# Patient Record
Sex: MALE | Race: WHITE | URBAN - METROPOLITAN AREA
[De-identification: names, ages, dates, MRNs, and addresses within clinical notes are randomized per-mention and may not be internally consistent; named-entity substitution may affect disease eponyms.]

---

## 2017-08-20 ENCOUNTER — INPATIENT (INPATIENT)
Facility: HOSPITAL | Age: 57
LOS: 1 days | Discharge: HOME | DRG: 299 | End: 2017-08-22
Attending: SURGERY | Admitting: SURGERY
Payer: COMMERCIAL

## 2017-08-20 VITALS
RESPIRATION RATE: 16 BRPM | SYSTOLIC BLOOD PRESSURE: 150 MMHG | DIASTOLIC BLOOD PRESSURE: 92 MMHG | HEART RATE: 90 BPM | TEMPERATURE: 99 F | WEIGHT: 189.82 LBS | OXYGEN SATURATION: 98 %

## 2017-08-20 DIAGNOSIS — I77.71 DISSECTION OF CAROTID ARTERY: ICD-10-CM

## 2017-08-20 DIAGNOSIS — Z98.890 OTHER SPECIFIED POSTPROCEDURAL STATES: Chronic | ICD-10-CM

## 2017-08-20 LAB
ALBUMIN SERPL ELPH-MCNC: 4.2 G/DL — SIGNIFICANT CHANGE UP (ref 3.3–5)
ALP SERPL-CCNC: 71 U/L — SIGNIFICANT CHANGE UP (ref 40–120)
ALT FLD-CCNC: 35 U/L — SIGNIFICANT CHANGE UP (ref 10–45)
ANION GAP SERPL CALC-SCNC: 11 MMOL/L — SIGNIFICANT CHANGE UP (ref 5–17)
AST SERPL-CCNC: 27 U/L — SIGNIFICANT CHANGE UP (ref 10–40)
BASOPHILS NFR BLD AUTO: 0.3 % — SIGNIFICANT CHANGE UP (ref 0–2)
BILIRUB SERPL-MCNC: 0.2 MG/DL — SIGNIFICANT CHANGE UP (ref 0.2–1.2)
BUN SERPL-MCNC: 15 MG/DL — SIGNIFICANT CHANGE UP (ref 7–23)
CALCIUM SERPL-MCNC: 10.2 MG/DL — SIGNIFICANT CHANGE UP (ref 8.4–10.5)
CHLORIDE SERPL-SCNC: 95 MMOL/L — LOW (ref 96–108)
CO2 SERPL-SCNC: 27 MMOL/L — SIGNIFICANT CHANGE UP (ref 22–31)
CREAT SERPL-MCNC: 0.8 MG/DL — SIGNIFICANT CHANGE UP (ref 0.5–1.3)
EOSINOPHIL NFR BLD AUTO: 1.6 % — SIGNIFICANT CHANGE UP (ref 0–6)
GLUCOSE SERPL-MCNC: 103 MG/DL — HIGH (ref 70–99)
HCT VFR BLD CALC: 43 % — SIGNIFICANT CHANGE UP (ref 39–50)
HGB BLD-MCNC: 15.1 G/DL — SIGNIFICANT CHANGE UP (ref 13–17)
LYMPHOCYTES # BLD AUTO: 23.7 % — SIGNIFICANT CHANGE UP (ref 13–44)
MCHC RBC-ENTMCNC: 30 PG — SIGNIFICANT CHANGE UP (ref 27–34)
MCHC RBC-ENTMCNC: 35.1 G/DL — SIGNIFICANT CHANGE UP (ref 32–36)
MCV RBC AUTO: 85.5 FL — SIGNIFICANT CHANGE UP (ref 80–100)
MONOCYTES NFR BLD AUTO: 8.2 % — SIGNIFICANT CHANGE UP (ref 2–14)
NEUTROPHILS NFR BLD AUTO: 66.2 % — SIGNIFICANT CHANGE UP (ref 43–77)
PLATELET # BLD AUTO: 298 K/UL — SIGNIFICANT CHANGE UP (ref 150–400)
POTASSIUM SERPL-MCNC: 4.7 MMOL/L — SIGNIFICANT CHANGE UP (ref 3.5–5.3)
POTASSIUM SERPL-SCNC: 4.7 MMOL/L — SIGNIFICANT CHANGE UP (ref 3.5–5.3)
PROT SERPL-MCNC: 7.8 G/DL — SIGNIFICANT CHANGE UP (ref 6–8.3)
RBC # BLD: 5.03 M/UL — SIGNIFICANT CHANGE UP (ref 4.2–5.8)
RBC # FLD: 12.5 % — SIGNIFICANT CHANGE UP (ref 10.3–16.9)
SODIUM SERPL-SCNC: 133 MMOL/L — LOW (ref 135–145)
WBC # BLD: 7.3 K/UL — SIGNIFICANT CHANGE UP (ref 3.8–10.5)
WBC # FLD AUTO: 7.3 K/UL — SIGNIFICANT CHANGE UP (ref 3.8–10.5)

## 2017-08-20 PROCEDURE — 70498 CT ANGIOGRAPHY NECK: CPT | Mod: 26

## 2017-08-20 PROCEDURE — 99291 CRITICAL CARE FIRST HOUR: CPT

## 2017-08-20 PROCEDURE — 93010 ELECTROCARDIOGRAM REPORT: CPT | Mod: NC

## 2017-08-20 PROCEDURE — 70496 CT ANGIOGRAPHY HEAD: CPT | Mod: 26

## 2017-08-20 PROCEDURE — 99285 EMERGENCY DEPT VISIT HI MDM: CPT | Mod: 25

## 2017-08-20 RX ORDER — LISINOPRIL 2.5 MG/1
40 TABLET ORAL DAILY
Qty: 0 | Refills: 0 | Status: DISCONTINUED | OUTPATIENT
Start: 2017-08-20 | End: 2017-08-22

## 2017-08-20 RX ORDER — VENLAFAXINE HCL 75 MG
300 CAPSULE, EXT RELEASE 24 HR ORAL DAILY
Qty: 0 | Refills: 0 | Status: DISCONTINUED | OUTPATIENT
Start: 2017-08-20 | End: 2017-08-22

## 2017-08-20 RX ORDER — RAMIPRIL 5 MG
1 CAPSULE ORAL
Qty: 0 | Refills: 0 | COMMUNITY

## 2017-08-20 RX ORDER — HYDRALAZINE HCL 50 MG
10 TABLET ORAL ONCE
Qty: 0 | Refills: 0 | Status: COMPLETED | OUTPATIENT
Start: 2017-08-20 | End: 2017-08-20

## 2017-08-20 RX ORDER — SODIUM CHLORIDE 9 MG/ML
1000 INJECTION INTRAMUSCULAR; INTRAVENOUS; SUBCUTANEOUS
Qty: 0 | Refills: 0 | Status: DISCONTINUED | OUTPATIENT
Start: 2017-08-20 | End: 2017-08-21

## 2017-08-20 RX ORDER — KETOROLAC TROMETHAMINE 30 MG/ML
15 SYRINGE (ML) INJECTION EVERY 6 HOURS
Qty: 0 | Refills: 0 | Status: DISCONTINUED | OUTPATIENT
Start: 2017-08-20 | End: 2017-08-21

## 2017-08-20 RX ORDER — INSULIN LISPRO 100/ML
VIAL (ML) SUBCUTANEOUS
Qty: 0 | Refills: 0 | Status: DISCONTINUED | OUTPATIENT
Start: 2017-08-20 | End: 2017-08-22

## 2017-08-20 RX ORDER — DEXTROSE 50 % IN WATER 50 %
25 SYRINGE (ML) INTRAVENOUS ONCE
Qty: 0 | Refills: 0 | Status: DISCONTINUED | OUTPATIENT
Start: 2017-08-20 | End: 2017-08-22

## 2017-08-20 RX ORDER — DEXTROSE 50 % IN WATER 50 %
12.5 SYRINGE (ML) INTRAVENOUS ONCE
Qty: 0 | Refills: 0 | Status: DISCONTINUED | OUTPATIENT
Start: 2017-08-20 | End: 2017-08-22

## 2017-08-20 RX ORDER — LIOTHYRONINE SODIUM 25 UG/1
12.5 TABLET ORAL
Qty: 0 | Refills: 0 | COMMUNITY

## 2017-08-20 RX ORDER — LABETALOL HCL 100 MG
10 TABLET ORAL EVERY 6 HOURS
Qty: 0 | Refills: 0 | Status: DISCONTINUED | OUTPATIENT
Start: 2017-08-20 | End: 2017-08-22

## 2017-08-20 RX ORDER — HEPARIN SODIUM 5000 [USP'U]/ML
3500 INJECTION INTRAVENOUS; SUBCUTANEOUS EVERY 6 HOURS
Qty: 0 | Refills: 0 | Status: DISCONTINUED | OUTPATIENT
Start: 2017-08-20 | End: 2017-08-20

## 2017-08-20 RX ORDER — HEPARIN SODIUM 5000 [USP'U]/ML
7000 INJECTION INTRAVENOUS; SUBCUTANEOUS ONCE
Qty: 0 | Refills: 0 | Status: COMPLETED | OUTPATIENT
Start: 2017-08-20 | End: 2017-08-20

## 2017-08-20 RX ORDER — HYDRALAZINE HCL 50 MG
5 TABLET ORAL ONCE
Qty: 0 | Refills: 0 | Status: COMPLETED | OUTPATIENT
Start: 2017-08-20 | End: 2017-08-20

## 2017-08-20 RX ORDER — HEPARIN SODIUM 5000 [USP'U]/ML
INJECTION INTRAVENOUS; SUBCUTANEOUS
Qty: 25000 | Refills: 0 | Status: DISCONTINUED | OUTPATIENT
Start: 2017-08-20 | End: 2017-08-21

## 2017-08-20 RX ORDER — HYDROCHLOROTHIAZIDE 25 MG
0 TABLET ORAL
Qty: 0 | Refills: 0 | COMMUNITY

## 2017-08-20 RX ORDER — ACETAMINOPHEN 500 MG
1000 TABLET ORAL ONCE
Qty: 0 | Refills: 0 | Status: COMPLETED | OUTPATIENT
Start: 2017-08-20 | End: 2017-08-20

## 2017-08-20 RX ORDER — TAMSULOSIN HYDROCHLORIDE 0.4 MG/1
0.4 CAPSULE ORAL AT BEDTIME
Qty: 0 | Refills: 0 | Status: DISCONTINUED | OUTPATIENT
Start: 2017-08-20 | End: 2017-08-21

## 2017-08-20 RX ORDER — ASPIRIN/CALCIUM CARB/MAGNESIUM 324 MG
325 TABLET ORAL ONCE
Qty: 0 | Refills: 0 | Status: COMPLETED | OUTPATIENT
Start: 2017-08-20 | End: 2017-08-20

## 2017-08-20 RX ORDER — GLUCAGON INJECTION, SOLUTION 0.5 MG/.1ML
1 INJECTION, SOLUTION SUBCUTANEOUS ONCE
Qty: 0 | Refills: 0 | Status: DISCONTINUED | OUTPATIENT
Start: 2017-08-20 | End: 2017-08-22

## 2017-08-20 RX ORDER — HYDROCORTISONE 20 MG
0 TABLET ORAL
Qty: 0 | Refills: 0 | COMMUNITY

## 2017-08-20 RX ORDER — VENLAFAXINE HCL 75 MG
300 CAPSULE, EXT RELEASE 24 HR ORAL
Qty: 0 | Refills: 0 | COMMUNITY

## 2017-08-20 RX ORDER — HEPARIN SODIUM 5000 [USP'U]/ML
7000 INJECTION INTRAVENOUS; SUBCUTANEOUS EVERY 6 HOURS
Qty: 0 | Refills: 0 | Status: DISCONTINUED | OUTPATIENT
Start: 2017-08-20 | End: 2017-08-20

## 2017-08-20 RX ORDER — SODIUM CHLORIDE 9 MG/ML
1000 INJECTION, SOLUTION INTRAVENOUS
Qty: 0 | Refills: 0 | Status: DISCONTINUED | OUTPATIENT
Start: 2017-08-20 | End: 2017-08-22

## 2017-08-20 RX ORDER — LEVOTHYROXINE SODIUM 125 MCG
12.5 TABLET ORAL DAILY
Qty: 0 | Refills: 0 | Status: DISCONTINUED | OUTPATIENT
Start: 2017-08-20 | End: 2017-08-22

## 2017-08-20 RX ORDER — BREXPIPRAZOLE 0.25 MG/1
1 TABLET ORAL
Qty: 0 | Refills: 0 | COMMUNITY

## 2017-08-20 RX ORDER — LEVOTHYROXINE SODIUM 125 MCG
12.5 TABLET ORAL
Qty: 0 | Refills: 0 | COMMUNITY

## 2017-08-20 RX ORDER — DEXTROSE 50 % IN WATER 50 %
1 SYRINGE (ML) INTRAVENOUS ONCE
Qty: 0 | Refills: 0 | Status: DISCONTINUED | OUTPATIENT
Start: 2017-08-20 | End: 2017-08-22

## 2017-08-20 RX ORDER — TAMSULOSIN HYDROCHLORIDE 0.4 MG/1
1 CAPSULE ORAL
Qty: 0 | Refills: 0 | COMMUNITY

## 2017-08-20 RX ORDER — MIRTAZAPINE 45 MG/1
60 TABLET, ORALLY DISINTEGRATING ORAL AT BEDTIME
Qty: 0 | Refills: 0 | Status: DISCONTINUED | OUTPATIENT
Start: 2017-08-20 | End: 2017-08-21

## 2017-08-20 RX ORDER — ASPIRIN/CALCIUM CARB/MAGNESIUM 324 MG
81 TABLET ORAL DAILY
Qty: 0 | Refills: 0 | Status: DISCONTINUED | OUTPATIENT
Start: 2017-08-20 | End: 2017-08-22

## 2017-08-20 RX ORDER — METOCLOPRAMIDE HCL 10 MG
10 TABLET ORAL ONCE
Qty: 0 | Refills: 0 | Status: COMPLETED | OUTPATIENT
Start: 2017-08-20 | End: 2017-08-20

## 2017-08-20 RX ORDER — LEVOTHYROXINE SODIUM 125 MCG
125 TABLET ORAL DAILY
Qty: 0 | Refills: 0 | Status: DISCONTINUED | OUTPATIENT
Start: 2017-08-20 | End: 2017-08-20

## 2017-08-20 RX ADMIN — Medication 15 MILLIGRAM(S): at 22:55

## 2017-08-20 RX ADMIN — HEPARIN SODIUM 1600 UNIT(S)/HR: 5000 INJECTION INTRAVENOUS; SUBCUTANEOUS at 19:46

## 2017-08-20 RX ADMIN — Medication 10 MILLIGRAM(S): at 22:55

## 2017-08-20 RX ADMIN — HEPARIN SODIUM 7000 UNIT(S): 5000 INJECTION INTRAVENOUS; SUBCUTANEOUS at 19:46

## 2017-08-20 RX ADMIN — Medication 325 MILLIGRAM(S): at 20:25

## 2017-08-20 RX ADMIN — Medication 400 MILLIGRAM(S): at 21:56

## 2017-08-20 RX ADMIN — Medication 1000 MILLIGRAM(S): at 22:56

## 2017-08-20 RX ADMIN — Medication 5 MILLIGRAM(S): at 20:25

## 2017-08-20 RX ADMIN — Medication 10 MILLIGRAM(S): at 16:32

## 2017-08-20 NOTE — ED ADULT NURSE NOTE - CHIEF COMPLAINT QUOTE
scuba diving in [place] on 8/2 and had clogged ears and HA ; also HTN; had hyperbaric treatment on last Monday and ears cleared up but still has HA; has seen PCP and started on meds for HTN; and has seen [facility]  who sent him here today because hyperbaric treatment did not help HA; supposed to see Alfie SHEIKH

## 2017-08-20 NOTE — CONSULT NOTE ADULT - SUBJECTIVE AND OBJECTIVE BOX
HPI:  Pt is a 56yo M PMHx chronic fatigue syndrome, depression, HTN who presents to [facility] ED with headache since 8/2/2017. Pt  went for scuber diving 8/2 and afterwards developed headache, nausea, unstable gait and inability to concentrate. He saw his PCP who referred him to ENT specialist. ENT physician recommended hyperbaric chamber which Pt received 6hrs of treatment on 8/14/16. Afterwards unstable gait and nausea resolved but headache persisted. During the headache episode, Pt is also unable to concentrated. Headache is described as throbbing and typically occurs in the evening and last about 5hrs. Pt has being taken excedrin which helped to relieve the headache. From 8/2/17, Pt denies blurry vision, dizziness, weakness, numbness, tingling, memory lost, change in speech, confusion, slurred speech, facial/eye drooping, CP/SOB.  ENT physician advised Pt to come to [facility] ED further evaluation. At the ED CTA head/neck was performed which revealed b/l carotid dissection. (20 Aug 2017 21:44)      PAST MEDICAL & SURGICAL HISTORY:  HTN (hypertension)  Chronic fatigue syndrome  Depression  History of back surgery: lumbar spine  H/O hernia repair      ROS: See HPI    MEDICATIONS  (STANDING):  heparin  Infusion.  Unit(s)/Hr (16 mL/Hr) IV Continuous <Continuous>  sodium chloride 0.9%. 1000 milliLiter(s) (100 mL/Hr) IV Continuous <Continuous>  lisinopril 40 milliGRAM(s) Oral daily  venlafaxine XR. 300 milliGRAM(s) Oral daily  mirtazapine 60 milliGRAM(s) Oral at bedtime  tamsulosin 0.4 milliGRAM(s) Oral at bedtime  insulin lispro (HumaLOG) corrective regimen sliding scale   SubCutaneous three times a day before meals  dextrose 5%. 1000 milliLiter(s) (50 mL/Hr) IV Continuous <Continuous>  dextrose 50% Injectable 12.5 Gram(s) IV Push once  dextrose 50% Injectable 25 Gram(s) IV Push once  dextrose 50% Injectable 25 Gram(s) IV Push once  levothyroxine 12.5 MICROGram(s) Oral daily    MEDICATIONS  (PRN):  labetalol Injectable 10 milliGRAM(s) IV Push every 6 hours PRN sbp> 140  dextrose Gel 1 Dose(s) Oral once PRN Blood Glucose LESS THAN 70 milliGRAM(s)/deciliter  glucagon  Injectable 1 milliGRAM(s) IntraMuscular once PRN Glucose LESS THAN 70 milligrams/deciliter  ketorolac   Injectable 15 milliGRAM(s) IV Push every 6 hours PRN Severe Pain (7 - 10)      Allergies    No Known Allergies    Intolerances        SOCIAL HISTORY:  Smoke: Never Smoker  EtOH: occasional    FAMILY HISTORY:      Vital Signs Last 24 Hrs  T(C): 36.1 (20 Aug 2017 21:15), Max: 37 (20 Aug 2017 15:01)  T(F): 97 (20 Aug 2017 21:15), Max: 98.6 (20 Aug 2017 15:01)  HR: 85 (20 Aug 2017 20:06) (83 - 90)  BP: 153/91 (20 Aug 2017 20:06) (150/92 - 153/91)  BP(mean): --  RR: 16 (20 Aug 2017 20:06) (16 - 16)  SpO2: 97% (20 Aug 2017 20:06) (97% - 98%)    PHYSICAL EXAM    Gen: NAD, AOx3  Neuro: CN II-XII intact  CV: RRR, no M/R/G  Pulm: CTABL, no wheezes or rhonchi  Abd: Soft, NT/ND  Ext: WWP, no edema  Vasc: distal pulses palpable    LABS:                        15.1   7.3   )-----------( 298      ( 20 Aug 2017 16:25 )             43.0     08-20    133<L>  |  95<L>  |  15  ----------------------------<  103<H>  4.7   |  27  |  0.80    Ca    10.2      20 Aug 2017 16:25    TPro  7.8  /  Alb  4.2  /  TBili  0.2  /  DBili  x   /  AST  27  /  ALT  35  /  AlkPhos  71  08-20    PT/INR - ( 20 Aug 2017 17:21 )   PT: 11.3 sec;   INR: 1.02          PTT - ( 20 Aug 2017 17:21 )  PTT:36.1 sec      RADIOLOGY & ADDITIONAL STUDIES:    Assessment and Plan:  56 y/o M PMHx chronic fatigue syndrome, depression, HTN with b/l internal carotid artery dissection HPI:  Pt is a 56yo M PMHx chronic fatigue syndrome, depression, HTN who presents to [facility] ED with headache since 8/2/2017. Pt  went for scuber diving 8/2 and afterwards developed headache, nausea, unstable gait and inability to concentrate. He saw his PCP who referred him to ENT specialist. ENT physician recommended hyperbaric chamber which Pt received 6hrs of treatment on 8/14/16. Afterwards unstable gait and nausea resolved but headache persisted. During the headache episode, Pt is also unable to concentrated. Headache is described as throbbing and typically occurs in the evening and last about 5hrs. Pt has being taken excedrin which helped to relieve the headache. From 8/2/17, Pt denies blurry vision, dizziness, weakness, numbness, tingling, memory lost, change in speech, confusion, slurred speech, facial/eye drooping, CP/SOB.  ENT physician advised Pt to come to [facility] ED further evaluation. At the ED CTA head/neck was performed which revealed b/l carotid dissection. (20 Aug 2017 21:44)      PAST MEDICAL & SURGICAL HISTORY:  HTN (hypertension)  Chronic fatigue syndrome  Depression  History of back surgery: lumbar spine  H/O hernia repair      ROS: See HPI    MEDICATIONS  (STANDING):  heparin  Infusion.  Unit(s)/Hr (16 mL/Hr) IV Continuous <Continuous>  sodium chloride 0.9%. 1000 milliLiter(s) (100 mL/Hr) IV Continuous <Continuous>  lisinopril 40 milliGRAM(s) Oral daily  venlafaxine XR. 300 milliGRAM(s) Oral daily  mirtazapine 60 milliGRAM(s) Oral at bedtime  tamsulosin 0.4 milliGRAM(s) Oral at bedtime  insulin lispro (HumaLOG) corrective regimen sliding scale   SubCutaneous three times a day before meals  dextrose 5%. 1000 milliLiter(s) (50 mL/Hr) IV Continuous <Continuous>  dextrose 50% Injectable 12.5 Gram(s) IV Push once  dextrose 50% Injectable 25 Gram(s) IV Push once  dextrose 50% Injectable 25 Gram(s) IV Push once  levothyroxine 12.5 MICROGram(s) Oral daily    MEDICATIONS  (PRN):  labetalol Injectable 10 milliGRAM(s) IV Push every 6 hours PRN sbp> 140  dextrose Gel 1 Dose(s) Oral once PRN Blood Glucose LESS THAN 70 milliGRAM(s)/deciliter  glucagon  Injectable 1 milliGRAM(s) IntraMuscular once PRN Glucose LESS THAN 70 milligrams/deciliter  ketorolac   Injectable 15 milliGRAM(s) IV Push every 6 hours PRN Severe Pain (7 - 10)      Allergies    No Known Allergies    Intolerances        SOCIAL HISTORY:  Smoke: Never Smoker  EtOH: occasional    FAMILY HISTORY:      Vital Signs Last 24 Hrs  T(C): 36.1 (20 Aug 2017 21:15), Max: 37 (20 Aug 2017 15:01)  T(F): 97 (20 Aug 2017 21:15), Max: 98.6 (20 Aug 2017 15:01)  HR: 85 (20 Aug 2017 20:06) (83 - 90)  BP: 153/91 (20 Aug 2017 20:06) (150/92 - 153/91)  BP(mean): --  RR: 16 (20 Aug 2017 20:06) (16 - 16)  SpO2: 97% (20 Aug 2017 20:06) (97% - 98%)    PHYSICAL EXAM    Gen: NAD, AOx3  Neuro: CN II-XII intact  CV: RRR, no M/R/G  Pulm: CTABL, no wheezes or rhonchi  Abd: Soft, NT/ND  Ext: WWP, no edema  Vasc: distal pulses palpable    LABS:                        15.1   7.3   )-----------( 298      ( 20 Aug 2017 16:25 )             43.0     08-20    133<L>  |  95<L>  |  15  ----------------------------<  103<H>  4.7   |  27  |  0.80    Ca    10.2      20 Aug 2017 16:25    TPro  7.8  /  Alb  4.2  /  TBili  0.2  /  DBili  x   /  AST  27  /  ALT  35  /  AlkPhos  71  08-20    PT/INR - ( 20 Aug 2017 17:21 )   PT: 11.3 sec;   INR: 1.02          PTT - ( 20 Aug 2017 17:21 )  PTT:36.1 sec      RADIOLOGY & ADDITIONAL STUDIES:    Assessment and Plan:  58 y/o M PMHx chronic fatigue syndrome, depression, HTN with b/l internal carotid artery dissection  58 y/o M with B/l Carotid dissection post scuba diving    Neuro: Neuro checks q1 effexor, remeron, Tylenol IV and toradol prn pain.   CV: HD stable BP goal 100-140 ASA 81 qd, hep gtt Lisinopril, labetalol prn  Pulm: Satting well placed on O2 for HA  GI: NPO pMN   : Voids Flomax   ID: [id number]  Endo: ISS synthroid  ppx: Heparin   Lines: PIV

## 2017-08-20 NOTE — ED PROVIDER NOTE - CARE PLAN
Principal Discharge DX:	Carotid artery dissection  Secondary Diagnosis:	Nonintractable headache, unspecified chronicity pattern, unspecified headache type

## 2017-08-20 NOTE — ED CLERICAL - NS ED CLERK NOTE PRE-ARRIVAL INFORMATION; ADDITIONAL PRE-ARRIVAL INFORMATION
PATIENT COMING IN FOR HIGH BLOOD PRESSURE WITH HEADACHE. PLEASE EVALUATE AND CALL DR. PATRICK REGARDING PATIENT.

## 2017-08-20 NOTE — H&P ADULT - NSHPPHYSICALEXAM_GEN_ALL_CORE
Vital Signs Last 24 Hrs  T(C): 36.1 (20 Aug 2017 21:15), Max: 37 (20 Aug 2017 15:01)  T(F): 97 (20 Aug 2017 21:15), Max: 98.6 (20 Aug 2017 15:01)  HR: 85 (20 Aug 2017 20:06) (83 - 90)  BP: 153/91 (20 Aug 2017 20:06) (150/92 - 153/91)  BP(mean): --  RR: 16 (20 Aug 2017 20:06) (16 - 16)  SpO2: 97% (20 Aug 2017 20:06) (97% - 98%)    General: Uncomfortable due to 6/10 headache, no facial or eye drooping  Eyes: PERRLA, normal visual acuity  Lungs: Unlabored breathing, no respiratory distress  Cardiac: RRR  Neck: no neck swelling, ecchymosis or erythema  Neuro: A/O x3, CN I-XII grossly intact, 5/5 upper and lower extremity strength b/l

## 2017-08-20 NOTE — ED ADULT NURSE REASSESSMENT NOTE - NS ED NURSE REASSESS COMMENT FT1
Pt /102.  MD made aware. Pt denies any changes in symptoms.  Ambulatory with even gait. Alert and oriented x3.

## 2017-08-20 NOTE — ED ADULT NURSE NOTE - OBJECTIVE STATEMENT
Patient alert and oriented x 3 came for constant headache with nausea since 8/2 after the scuba diving . Pt. is diagnosed with HTN recently and taking the medicine as prescribed . Denies any vomiting , blurry vision nor dizziness at this time . Not in distress , safety maintained . Will continue to monitor .

## 2017-08-20 NOTE — H&P ADULT - HISTORY OF PRESENT ILLNESS
Pt is a 58yo M PMHx chronic fatigue syndrome, depression, HTN who presents to [facility] ED with headache since 8/2/2017. Pt  went for scuber diving 8/2 and afterwards developed the headache, nausea, unstable gait and inability to concentrate. He saw his PCP who referred him to ENT specialist. ENT physician recommended hyperbaric chamber which Pt received 6hrs of treatment on 8/14/16. Afterwards unstable gait and nausea resolved but headache persisted. During the headache episode, Pt is also unable to concentrated. Headache is described as throbbing and typically occurs in the evening and last about 5hrs. Pt has being taken excedrin which helped to relieve the headache. From 8/2/17, Pt denies blurry vision, dizziness, weakness, numbness, tingling, memory lost, change in speech, confusion, slurred speech, facial/eye drooping, CP/SOB.  ENT physician advised Pt to come to [facility] ED further evaluation. At the ED CTA head/neck was performed which revealed b/l carotid dissection. Pt is a 58yo M PMHx chronic fatigue syndrome, depression, HTN who presents to [facility] ED with headache since 8/2/2017. Pt  went for scuber diving 8/2 and afterwards developed headache, nausea, unstable gait and inability to concentrate. He saw his PCP who referred him to ENT specialist. ENT physician recommended hyperbaric chamber which Pt received 6hrs of treatment on 8/14/16. Afterwards unstable gait and nausea resolved but headache persisted. During the headache episode, Pt is also unable to concentrated. Headache is described as throbbing and typically occurs in the evening and last about 5hrs. Pt has being taken excedrin which helped to relieve the headache. From 8/2/17, Pt denies blurry vision, dizziness, weakness, numbness, tingling, memory lost, change in speech, confusion, slurred speech, facial/eye drooping, CP/SOB.  ENT physician advised Pt to come to [facility] ED further evaluation. At the ED CTA head/neck was performed which revealed b/l carotid dissection. Pt is a 56yo M PMHx chronic fatigue syndrome, depression, HTN who presents to [facility] ED with headache since 8/2/2017. Pt  went scuba diving 8/2 and afterwards developed headache, nausea, unstable gait and inability to concentrate. He saw his PCP who referred him to ENT specialist. ENT physician recommended hyperbaric chamber which Pt received 6hrs of treatment on 8/14/16. Afterwards unstable gait and nausea resolved but headache persisted. During the headache episode, Pt is also unable to concentrate. Headache is described as throbbing and typically occurs in the evening and last about 5hrs. Pt has being taken excedrin which helped to relieve the headache. From 8/2/17, Pt denies blurry vision, dizziness, weakness, numbness, tingling, memory lost, change in speech, confusion, slurred speech, facial/eye drooping, CP/SOB.  ENT physician advised Pt to come to [facility] ED further evaluation. At the ED CTA head/neck was performed which revealed b/l carotid dissection.

## 2017-08-20 NOTE — ED PROVIDER NOTE - MEDICAL DECISION MAKING DETAILS
here w/ continiued HA after diving despite appropriate therapy w/ hyperbarics which did improve but not take away his HA. I did head ct and angio for concern for alternative diagnosis and turns out that pt has bilateral carotid dissection. will consult vascular

## 2017-08-20 NOTE — ED ADULT TRIAGE NOTE - CHIEF COMPLAINT QUOTE
scuba diving in [facility] on 8/2 and had clogged ears and HA ; also HTN; had hyperbaric treatment on last Monday and ears cleared up but still has HA; has seen PCP and started on meds for HTN; and has seen [facility]  who sent him here today because hyperbaric treatment did not help HA; supposed to see Alfie SHEIKH

## 2017-08-20 NOTE — CONSULT NOTE ADULT - ASSESSMENT
A/recs:  1) bilateral internal carotid artery dissection  -management per Vascular surgery - agree with bb  -currently on heparin gtt  -check lipid panel in am  -rec Neuro eval   2) htn - elevated bp   -check echo in am  -check thyroid studies, ua, and bnp  -monitor on rx  3) chronic fatigue and depression - per primary team  4) maintain K>4, Mg>2  case discussed with Dr. Miller, [facility], and Millie

## 2017-08-20 NOTE — CONSULT NOTE ADULT - CONSULT REASON
Initial Cardiology evaluation and management of head ache with h/o HTN found to have bilateral internal carotid artery dissection

## 2017-08-20 NOTE — H&P ADULT - PROBLEM SELECTOR PLAN 1
Continue heparin gtt (PTT goal 60-80)  BP control (-140)  Aspirin daily  NPO after midnight  Home medication  Neuro checks q1hr Continue heparin gtt (PTT goal 60-80)  BP control (-140)  Aspirin daily  NPO after midnight/IVF  Home medication  Neuro checks q1hr  AM labs

## 2017-08-20 NOTE — ED ADULT NURSE NOTE - CHPI ED SYMPTOMS NEG
no fever/no vomiting/no numbness/no blurred vision/no loss of consciousness/no dizziness/no change in level of consciousness/no weakness/no confusion

## 2017-08-20 NOTE — CONSULT NOTE ADULT - ATTENDING COMMENTS
CCM ATTENDING    Patient seen and discussed with House Officer/Resident  Chart and history reviewed  Exam, labs, and radiology as noted above   Assessment and Plans as outlined  no surgical interventions at present plan for anticoagulation per vascSx team  neuro stable at present  Currently patient's mentation is appropriate   Protecting airway   Breathing is non-labored without increased work of breathing --- no intercostal accessory muscle use and no paradoxical abdominal motion evident   Ventilating and oxygenating adequately without increased work of breathing  Hemodynamically stable---clinically perfusing adequately  Aim to maintain MAP >= 65 mmHg, U/O >= 0.5 ml/kg/hr, pulse oximetry OxSat >= 92%   Metabolic demands along with any abnormal blood chemistries, and fluid requirements being addressed    Sedation and/or pain meds as needed to reduce metabolic demand and maintain comfort   GI/DVT prophylaxis

## 2017-08-20 NOTE — CONSULT NOTE ADULT - SUBJECTIVE AND OBJECTIVE BOX
patient seen and examined on 2017 - this note is for dos 2017  cc: Patient is a 57y old  Male who presents with a chief complaint of Headache - h/o HTN - found to have bilateral internal carotid artery dissection    HPI:  57yom - h/o chronic fatigue syndrome, depression, HTN - sent to ED by ENT ([place]) for elevated blood pressure and headache.  Of note, hyperbaric oxygen 1 week ago at [facility] for suspected bends after scuba diving 3 weeks ago in [facility].  Following scuba diving patient experienced headache, nausea, unstable gait and inability to concentrate.  Following hyperbarix oxygen most symptoms improved, however, headache continued.  Headache improved with excedrin.  Headache described as throbbing.  Sent to er today for continued headache and bp elevations.  Now found to have bilateral carotid artery dissection.  Denies chest pain, dyspnea, palpitations, dizziness, lightheadedness, near-syncope, or syncope. No focal weakness.  Currently without acute complaints.  Patient now in SICU.      PAST MEDICAL & SURGICAL HISTORY:  HTN (hypertension)  Chronic fatigue syndrome  Depression  History of back surgery: lumbar spine  H/O hernia repair    MEDICATIONS  (STANDING):  heparin  Infusion.  Unit(s)/Hr (16 mL/Hr) IV Continuous <Continuous>  sodium chloride 0.9%. 1000 milliLiter(s) (100 mL/Hr) IV Continuous <Continuous>  lisinopril 40 milliGRAM(s) Oral daily  venlafaxine XR. 300 milliGRAM(s) Oral daily  mirtazapine 60 milliGRAM(s) Oral at bedtime  tamsulosin 0.4 milliGRAM(s) Oral at bedtime  insulin lispro (HumaLOG) corrective regimen sliding scale   SubCutaneous three times a day before meals  dextrose 5%. 1000 milliLiter(s) (50 mL/Hr) IV Continuous <Continuous>  dextrose 50% Injectable 12.5 Gram(s) IV Push once  dextrose 50% Injectable 25 Gram(s) IV Push once  dextrose 50% Injectable 25 Gram(s) IV Push once  levothyroxine 12.5 MICROGram(s) Oral daily  aspirin  chewable 81 milliGRAM(s) Oral daily    MEDICATIONS  (PRN):  labetalol Injectable 10 milliGRAM(s) IV Push every 6 hours PRN sbp> 140  dextrose Gel 1 Dose(s) Oral once PRN Blood Glucose LESS THAN 70 milliGRAM(s)/deciliter  glucagon  Injectable 1 milliGRAM(s) IntraMuscular once PRN Glucose LESS THAN 70 milligrams/deciliter  ketorolac   Injectable 15 milliGRAM(s) IV Push every 6 hours PRN Severe Pain (7 - 10)    FAMILY HISTORY: father with mi leading to death at 39 yo - father was "heavy" smoker    SOCIAL HISTORY: 1/2ppd smoking for approximately 15 years - quit 10 yrs ago; rare etoh; denied drug use;  in [place]; ; no children    REVIEW OF SYSTEMS  General:	no fever/chills    Ophthalmologic: no vision change  	  ENMT:	no nasal congestion    Respiratory and Thorax:no dyspnea  	  Cardiovascular:	no cp    Gastrointestinal:	no abd pain, n/v    Genitourinary:	no dysuria    Musculoskeletal:	no focal weakness    Neurological:	headache as above    Psychiatric:	appropriate    Hematology/Lymphatics:no anemia	    Endocrine:	no dm or thyroid disease    Allergic/Immunologic:	nkda    Vital Signs Last 24 Hrs  T(C): 36.1 (20 Aug 2017 21:15), Max: 37 (20 Aug 2017 15:01)  T(F): 97 (20 Aug 2017 21:15), Max: 98.6 (20 Aug 2017 15:01)  HR: 85 (20 Aug 2017 20:06) (83 - 90)  BP: 153/91 (20 Aug 2017 20:06) (150/92 - 153/91)  BP(mean): --  RR: 16 (20 Aug 2017 20:06) (16 - 16)  SpO2: 97% (20 Aug 2017 20:06) (97% - 98%)    PHYSICAL EXAM:  Constitutional:nad; sitting in bed    Eyes:anicteric    ENMT:mmm    Neck no jvd/hjr    Respiratory:clear breath sounds     Cardiovascular:rr; s1/s2 present    Gastrointestinal:soft abd; bowel sounds present    Extremities:no edema    Vascular:le pulses present    Neurological:grossly nonfocal; conversant    Skin:warm    Musculoskeletal:moves 4 ext    Psychiatric:appropriate    EC2017 ecg reviewed    LABS:                        15.1   7.3   )-----------( 298      ( 20 Aug 2017 16:25 )             43.0     08-20    133<L>  |  95<L>  |  15  ----------------------------<  103<H>  4.7   |  27  |  0.80    Ca    10.2      20 Aug 2017 16:25    TPro  7.8  /  Alb  4.2  /  TBili  0.2  /  DBili  x   /  AST  27  /  ALT  35  /  AlkPhos  71  08-20    CARDIAC MARKERS ( 20 Aug 2017 16:25 )  x     / <0.01 ng/mL / x     / x     / x          PT/INR - ( 20 Aug 2017 17:21 )   PT: 11.3 sec;   INR: 1.02          PTT - ( 20 Aug 2017 17:21 )  PTT:36.1 sec    RADIOLOGY & ADDITIONAL STUDIES:  < from: CT Angio Neck w/ IV Cont (17 @ 17:52) >  EXAM:  CT ANGIO BRAIN (W)AW IC                          EXAM:  CT ANGIO NECK (W)AW IC                          PROCEDURE DATE:  2017     67  [employer] 350   5           INTERPRETATION:  CT ANGIOGRAM OF THE CERVICAL ARTERIES   CTA OF THE [place]:    INDICATION: Headache after scuba diving 3 weeks prior.    TECHNIQUE:     CTA [place]:     After the intravenous power injection of non-ionic contrast material,   serial thin sections were obtained through the intracranial circulation   on a multislice CT scanner followed by multiplanar 3-D reformations.   There are no prior studies.    CTA NECK:    After the intravenous power injection of non-ionic contrast material,   serial thin sections were obtained through the cervical circulation on a   multislice CT scanner followed by multiplanar 3-D reformations. There are   no prior studies.    FINDINGS:    CTA [place]:    Posterior circulation demonstrates a hypoplastic right vertebral artery   that predominantly endsin PICA, normal variant. The distal left   vertebral artery and basilar artery are unremarkable. There is slight   ectasia of the basilar artery. Patient demonstrates bilateral fetal   origin PCAs with hypoplastic P1 segments bilaterally, normal variant. No   significant stenosis is seen in the intracranial posterior circulation.    No stenosis is seen in the M[place] bilaterally or MCA bilaterally. The   anterior communicating artery is hypoplastic, normal variant. No stenosis   is seen in the cavernous or supraclinoid ICA bilaterally..    CTA NECK:    Origins of the left subclavian artery, left common carotid artery, right   innominate artery, right subclavian artery, right common carotid artery   are normal. Left vertebral artery origin is patent. Left vertebral artery   is dominant. The right vertebral artery origin is patent. The right   vertebral artery is hypoplastic and predominantly ends in PICA, normal   variant.     At the carotid bifurcation, there is no significant stenosis.    There is marked abnormality of the left internal carotid artery in its   cervical portion, in which there is an abrupt change in caliber at   approximately the C3 level with a suggestion of an intimal flap   consistent with ICA dissection, and there is an area of flow-related   enhancement projecting lateral to the lumen consistent with   pseudoaneurysm. Beyond this area of abnormality, the caliber to the upper   cervical left ICA to the proximal petrous left ICA is thinner in caliber   but there isgood flow seen in the upper cervical left ICA and into the   left petrous ICA where the vessel assumes a more normal caliber in the   mid and distal left petrous ICA. Proximal to this dissection, the lumen   has a slightly nodular appearance suggestive of fibromuscular dysplasia   or mixed connective tissue disorder, which may be an etiology for the   dissection.    There is marked abnormality of the right internal carotid artery just   past its origin in which the lumen has a nodular appearance,which   suggests fibromuscular dysplasia or mixed connective tissue disorder. In   addition, in the upper right cervical ICA there is  an area of linear low   attenuation within the lumen at the C1-C2 level consistent with an   intimal flap of dissection. Medially, there is focal area of flow-related   enhancement suggestive of pseudoaneurysm. Beyond this there is thinner   caliber to the uppermost cervical right ICA extending into the proximal   right petrous ICA segment, which is not flow-limiting, and there is a   more normal caliber at to the mid and distal right petrous ICA.    There is multilevel degenerative disc disease in which findings loss of   disc space height and endplate sclerosis particularly at C5-C6 and C6-C7   levels. Thereappears be a left paracentral hypertrophic foraminal disc   protrusion at the C6-C7 level and a central disc protrusion at the C5-C6   level. There is multilevel degenerative osteoarthritis. Findings include   endplate osteophytosis and uncovertebralspurring. This results in   multilevel foraminal narrowing particularly at the C3-C4 level   bilaterally; left-sided at the C4-C5 level; bilaterally at the C5-C6   level; and left-sided at the C6-C7 level.    IMPRESSION:       CTA COW:      1. No intracranial stenosis of the [place].  2. Normal variants of anatomy including bilateral fetal origin PCAs and   hypoplastic right vertebral artery predominantly ending in PICA.    CTA NECK:    1. Bilateral ICA dissections with pseudoaneurysm formation bilaterally   and areas of concentric narrowing of the upper most cervical ICA   bilaterally, left more than right, extending into the proximal petrous   ICA bilaterally but there is good flow past the area of dissections   bilaterally and a more normal caliber to the mid and distal petrous ICA   bilaterally.  2. Nodularity to the ICA lumen bilaterally proximal to the dissection   suggest underlying mixed connective tissue disorder such as fibromuscular   dysplasia, which may be the underlying etiology for the bilateral   dissections.   3. Multilevel degenerative disc disease and osteoarthritis, particularly   at the C5-C6 and C6-C7 levels.  4. Hypoplastic right vertebral artery predominantly ends in PICA, normal   variant.    Results were discussed with Dr. Miller at the time of dictation.            "Thank you for the opportunity to participate in the care of this   patient."        SONYA NAVA M.D., ATTENDING RADIOLOGIST  This document has been electronically signed. Aug 20 2017  6:26PM        < end of copied text >  < from: CT Head No Cont (17 @ 17:51) >  EXAM:  CT BRAIN                          PROCEDURE DATE:  2017                     INTERPRETATION:  INDICATIONS: Headache after scuba diving 3 weeks prior.    TECHNIQUE:  Serial axial images were obtained from the skull base to the   vertex without the use of intravenous contrast.    COMPARISON EXAMINATION: None.    FINDINGS:    VENTRICLES AND SULCI: Parenchymal volume loss is present which is   commensurate with patient age.   INTRA-AXIAL: No intracranial mass, acute hemorrhage, or midline shift is   present. Gray-white differentiation is preserved.  EXTRA-AXIAL: No extra-axial fluid collection is present.   VISUALIZED SINUSES: No air-fluid levels are identified. There is minimal   mucosal thickening in the ethmoid air cells bilaterally. VISUALIZED   MASTOIDS:  There is minor opacification within the mastoid air cells   bilaterally.  CALVARIUM: No fracture is seen.    IMPRESSION:    No CT evidence of acute intracranial hemorrhage and no apparent acute   abnormality.            "Thank you for the opportunity to participate in the care of this   patient."        SONYA NAVA M.D., ATTENDING RADIOLOGIST  This document has been electronically signed. Aug 20 2017  5:49PM    < end of copied text >

## 2017-08-21 ENCOUNTER — TRANSCRIPTION ENCOUNTER (OUTPATIENT)
Age: 57
End: 2017-08-21

## 2017-08-21 DIAGNOSIS — E87.1 HYPO-OSMOLALITY AND HYPONATREMIA: ICD-10-CM

## 2017-08-21 DIAGNOSIS — I77.3 ARTERIAL FIBROMUSCULAR DYSPLASIA: ICD-10-CM

## 2017-08-21 DIAGNOSIS — I10 ESSENTIAL (PRIMARY) HYPERTENSION: ICD-10-CM

## 2017-08-21 LAB
ANION GAP SERPL CALC-SCNC: 11 MMOL/L — SIGNIFICANT CHANGE UP (ref 5–17)
ANION GAP SERPL CALC-SCNC: 14 MMOL/L — SIGNIFICANT CHANGE UP (ref 5–17)
APPEARANCE UR: CLEAR — SIGNIFICANT CHANGE UP
APTT BLD: 116.6 SEC — HIGH (ref 27.5–37.4)
APTT BLD: 35.6 SEC — SIGNIFICANT CHANGE UP (ref 27.5–37.4)
APTT BLD: 59.3 SEC — HIGH (ref 27.5–37.4)
APTT BLD: >200 SEC — CRITICAL HIGH (ref 27.5–37.4)
BILIRUB UR-MCNC: NEGATIVE — SIGNIFICANT CHANGE UP
BUN SERPL-MCNC: 13 MG/DL — SIGNIFICANT CHANGE UP (ref 7–23)
BUN SERPL-MCNC: 15 MG/DL — SIGNIFICANT CHANGE UP (ref 7–23)
CALCIUM SERPL-MCNC: 9.1 MG/DL — SIGNIFICANT CHANGE UP (ref 8.4–10.5)
CALCIUM SERPL-MCNC: 9.3 MG/DL — SIGNIFICANT CHANGE UP (ref 8.4–10.5)
CHLORIDE SERPL-SCNC: 93 MMOL/L — LOW (ref 96–108)
CHLORIDE SERPL-SCNC: 95 MMOL/L — LOW (ref 96–108)
CO2 SERPL-SCNC: 22 MMOL/L — SIGNIFICANT CHANGE UP (ref 22–31)
CO2 SERPL-SCNC: 24 MMOL/L — SIGNIFICANT CHANGE UP (ref 22–31)
COLOR SPEC: YELLOW — SIGNIFICANT CHANGE UP
CREAT SERPL-MCNC: 0.7 MG/DL — SIGNIFICANT CHANGE UP (ref 0.5–1.3)
CREAT SERPL-MCNC: 0.8 MG/DL — SIGNIFICANT CHANGE UP (ref 0.5–1.3)
DIFF PNL FLD: NEGATIVE — SIGNIFICANT CHANGE UP
GLUCOSE SERPL-MCNC: 107 MG/DL — HIGH (ref 70–99)
GLUCOSE SERPL-MCNC: 120 MG/DL — HIGH (ref 70–99)
GLUCOSE UR QL: NEGATIVE — SIGNIFICANT CHANGE UP
HCT VFR BLD CALC: 40.9 % — SIGNIFICANT CHANGE UP (ref 39–50)
HCT VFR BLD CALC: 41.1 % — SIGNIFICANT CHANGE UP (ref 39–50)
HGB BLD-MCNC: 14.5 G/DL — SIGNIFICANT CHANGE UP (ref 13–17)
HGB BLD-MCNC: 14.5 G/DL — SIGNIFICANT CHANGE UP (ref 13–17)
KETONES UR-MCNC: 40 MG/DL
LEUKOCYTE ESTERASE UR-ACNC: NEGATIVE — SIGNIFICANT CHANGE UP
MAGNESIUM SERPL-MCNC: 2 MG/DL — SIGNIFICANT CHANGE UP (ref 1.6–2.6)
MCHC RBC-ENTMCNC: 29.9 PG — SIGNIFICANT CHANGE UP (ref 27–34)
MCHC RBC-ENTMCNC: 30 PG — SIGNIFICANT CHANGE UP (ref 27–34)
MCHC RBC-ENTMCNC: 35.3 G/DL — SIGNIFICANT CHANGE UP (ref 32–36)
MCHC RBC-ENTMCNC: 35.5 G/DL — SIGNIFICANT CHANGE UP (ref 32–36)
MCV RBC AUTO: 84.7 FL — SIGNIFICANT CHANGE UP (ref 80–100)
MCV RBC AUTO: 84.7 FL — SIGNIFICANT CHANGE UP (ref 80–100)
NITRITE UR-MCNC: NEGATIVE — SIGNIFICANT CHANGE UP
OSMOLALITY SERPL: 272 MOSM/KG — LOW (ref 280–301)
OSMOLALITY UR: 446 MOSMOL/KG — SIGNIFICANT CHANGE UP (ref 100–650)
OSMOLALITY UR: 491 MOSMOL/KG — SIGNIFICANT CHANGE UP (ref 100–650)
PH UR: 6.5 — SIGNIFICANT CHANGE UP (ref 5–8)
PHOSPHATE SERPL-MCNC: 3.1 MG/DL — SIGNIFICANT CHANGE UP (ref 2.5–4.5)
PLATELET # BLD AUTO: 277 K/UL — SIGNIFICANT CHANGE UP (ref 150–400)
PLATELET # BLD AUTO: 283 K/UL — SIGNIFICANT CHANGE UP (ref 150–400)
POTASSIUM SERPL-MCNC: 3.8 MMOL/L — SIGNIFICANT CHANGE UP (ref 3.5–5.3)
POTASSIUM SERPL-MCNC: 4.3 MMOL/L — SIGNIFICANT CHANGE UP (ref 3.5–5.3)
POTASSIUM SERPL-SCNC: 3.8 MMOL/L — SIGNIFICANT CHANGE UP (ref 3.5–5.3)
POTASSIUM SERPL-SCNC: 4.3 MMOL/L — SIGNIFICANT CHANGE UP (ref 3.5–5.3)
PROT UR-MCNC: NEGATIVE MG/DL — SIGNIFICANT CHANGE UP
RBC # BLD: 4.83 M/UL — SIGNIFICANT CHANGE UP (ref 4.2–5.8)
RBC # BLD: 4.85 M/UL — SIGNIFICANT CHANGE UP (ref 4.2–5.8)
RBC # FLD: 12.4 % — SIGNIFICANT CHANGE UP (ref 10.3–16.9)
RBC # FLD: 12.5 % — SIGNIFICANT CHANGE UP (ref 10.3–16.9)
SODIUM SERPL-SCNC: 129 MMOL/L — LOW (ref 135–145)
SODIUM SERPL-SCNC: 130 MMOL/L — LOW (ref 135–145)
SODIUM UR-SCNC: 72 MMOL/L — SIGNIFICANT CHANGE UP
SP GR SPEC: 1.01 — SIGNIFICANT CHANGE UP (ref 1–1.03)
UROBILINOGEN FLD QL: 0.2 E.U./DL — SIGNIFICANT CHANGE UP
WBC # BLD: 9.4 K/UL — SIGNIFICANT CHANGE UP (ref 3.8–10.5)
WBC # BLD: 9.5 K/UL — SIGNIFICANT CHANGE UP (ref 3.8–10.5)
WBC # FLD AUTO: 9.4 K/UL — SIGNIFICANT CHANGE UP (ref 3.8–10.5)
WBC # FLD AUTO: 9.5 K/UL — SIGNIFICANT CHANGE UP (ref 3.8–10.5)

## 2017-08-21 PROCEDURE — 70551 MRI BRAIN STEM W/O DYE: CPT | Mod: 26

## 2017-08-21 PROCEDURE — 99223 1ST HOSP IP/OBS HIGH 75: CPT | Mod: GC

## 2017-08-21 PROCEDURE — 74174 CTA ABD&PLVS W/CONTRAST: CPT | Mod: 26

## 2017-08-21 PROCEDURE — 99223 1ST HOSP IP/OBS HIGH 75: CPT

## 2017-08-21 RX ORDER — ACETAMINOPHEN 500 MG
1000 TABLET ORAL ONCE
Qty: 0 | Refills: 0 | Status: COMPLETED | OUTPATIENT
Start: 2017-08-21 | End: 2017-08-21

## 2017-08-21 RX ORDER — KETOROLAC TROMETHAMINE 30 MG/ML
15 SYRINGE (ML) INJECTION ONCE
Qty: 0 | Refills: 0 | Status: DISCONTINUED | OUTPATIENT
Start: 2017-08-21 | End: 2017-08-21

## 2017-08-21 RX ORDER — HEPARIN SODIUM 5000 [USP'U]/ML
1300 INJECTION INTRAVENOUS; SUBCUTANEOUS
Qty: 25000 | Refills: 0 | Status: DISCONTINUED | OUTPATIENT
Start: 2017-08-21 | End: 2017-08-21

## 2017-08-21 RX ORDER — KETOROLAC TROMETHAMINE 30 MG/ML
30 SYRINGE (ML) INJECTION EVERY 6 HOURS
Qty: 0 | Refills: 0 | Status: DISCONTINUED | OUTPATIENT
Start: 2017-08-21 | End: 2017-08-22

## 2017-08-21 RX ORDER — KETOROLAC TROMETHAMINE 30 MG/ML
30 SYRINGE (ML) INJECTION EVERY 6 HOURS
Qty: 0 | Refills: 0 | Status: DISCONTINUED | OUTPATIENT
Start: 2017-08-21 | End: 2017-08-21

## 2017-08-21 RX ORDER — MIRTAZAPINE 45 MG/1
60 TABLET, ORALLY DISINTEGRATING ORAL AT BEDTIME
Qty: 0 | Refills: 0 | Status: DISCONTINUED | OUTPATIENT
Start: 2017-08-21 | End: 2017-08-22

## 2017-08-21 RX ORDER — HEPARIN SODIUM 5000 [USP'U]/ML
1500 INJECTION INTRAVENOUS; SUBCUTANEOUS
Qty: 25000 | Refills: 0 | Status: DISCONTINUED | OUTPATIENT
Start: 2017-08-21 | End: 2017-08-22

## 2017-08-21 RX ORDER — TAMSULOSIN HYDROCHLORIDE 0.4 MG/1
0.4 CAPSULE ORAL AT BEDTIME
Qty: 0 | Refills: 0 | Status: DISCONTINUED | OUTPATIENT
Start: 2017-08-21 | End: 2017-08-22

## 2017-08-21 RX ORDER — POTASSIUM CHLORIDE 20 MEQ
20 PACKET (EA) ORAL ONCE
Qty: 0 | Refills: 0 | Status: COMPLETED | OUTPATIENT
Start: 2017-08-21 | End: 2017-08-21

## 2017-08-21 RX ORDER — HYDRALAZINE HCL 50 MG
10 TABLET ORAL EVERY 6 HOURS
Qty: 0 | Refills: 0 | Status: DISCONTINUED | OUTPATIENT
Start: 2017-08-21 | End: 2017-08-22

## 2017-08-21 RX ADMIN — MIRTAZAPINE 60 MILLIGRAM(S): 45 TABLET, ORALLY DISINTEGRATING ORAL at 00:46

## 2017-08-21 RX ADMIN — Medication 30 MILLIGRAM(S): at 12:23

## 2017-08-21 RX ADMIN — Medication 300 MILLIGRAM(S): at 12:24

## 2017-08-21 RX ADMIN — Medication 10 MILLIGRAM(S): at 05:17

## 2017-08-21 RX ADMIN — Medication 10 MILLIGRAM(S): at 18:57

## 2017-08-21 RX ADMIN — Medication 15 MILLIGRAM(S): at 00:45

## 2017-08-21 RX ADMIN — Medication 1 TABLET(S): at 23:16

## 2017-08-21 RX ADMIN — Medication 15 MILLIGRAM(S): at 01:22

## 2017-08-21 RX ADMIN — Medication 30 MILLIGRAM(S): at 19:09

## 2017-08-21 RX ADMIN — Medication 30 MILLIGRAM(S): at 20:19

## 2017-08-21 RX ADMIN — Medication 30 MILLIGRAM(S): at 13:00

## 2017-08-21 RX ADMIN — Medication 30 MILLIGRAM(S): at 04:45

## 2017-08-21 RX ADMIN — Medication 81 MILLIGRAM(S): at 12:23

## 2017-08-21 RX ADMIN — Medication 15 MILLIGRAM(S): at 00:06

## 2017-08-21 RX ADMIN — TAMSULOSIN HYDROCHLORIDE 0.4 MILLIGRAM(S): 0.4 CAPSULE ORAL at 00:45

## 2017-08-21 RX ADMIN — Medication 12.5 MICROGRAM(S): at 08:16

## 2017-08-21 RX ADMIN — MIRTAZAPINE 60 MILLIGRAM(S): 45 TABLET, ORALLY DISINTEGRATING ORAL at 22:05

## 2017-08-21 RX ADMIN — LISINOPRIL 40 MILLIGRAM(S): 2.5 TABLET ORAL at 08:18

## 2017-08-21 RX ADMIN — Medication 10 MILLIGRAM(S): at 01:38

## 2017-08-21 RX ADMIN — Medication 1000 MILLIGRAM(S): at 06:46

## 2017-08-21 RX ADMIN — TAMSULOSIN HYDROCHLORIDE 0.4 MILLIGRAM(S): 0.4 CAPSULE ORAL at 22:05

## 2017-08-21 RX ADMIN — Medication 30 MILLIGRAM(S): at 05:19

## 2017-08-21 RX ADMIN — Medication 1 TABLET(S): at 22:32

## 2017-08-21 RX ADMIN — Medication 400 MILLIGRAM(S): at 05:32

## 2017-08-21 RX ADMIN — Medication 20 MILLIEQUIVALENT(S): at 08:20

## 2017-08-21 NOTE — CONSULT NOTE ADULT - PROBLEM SELECTOR RECOMMENDATION 9
Asymptomatic Hyponatremia likely due to ADH stimulus due to pain/headache and/or SNRI use.  Urinalysis, Urine electrolytes, Urine osmolality  Control of pain  Monitor BMP every 12 hrly for now  Monitor mental status/respiratory status  Free water restriction to <1L/day  Avoid Nephrotoxic agents  Consider alternative medications for SNRI with psych team. Asymptomatic Hyponatremia likely due to ADH stimulus due to pain/headache and/or SNRI use.  Urinalysis, Urine electrolytes, Urine osmolality  Control of pain  Monitor BMP every 12 hrly for now  Monitor mental status/respiratory status  Free water restriction to <1L/day  Avoid Nephrotoxic agents.  Consider alternative medications for SNRI with psych team. Asymptomatic Hyponatremia likely due to inappropriate ADH stimulus due to pain/headache and/or SNRI use/ Carotid artery and celiac artery dissection.  Urinalysis, Urine electrolytes, Urine osmolality  Control of pain  Monitor BMP every 12 hrly for now  Monitor mental status/respiratory status  Free water restriction to <1L/day  Avoid Nephrotoxic agents.  Consider alternative medications for SNRI with psych team.

## 2017-08-21 NOTE — DISCHARGE NOTE ADULT - CARE PROVIDER_API CALL
Feliberto Pinto), Surgery  [address] [address]  Phone: [phone]  Fax: [phone]    Lea Landeros), Internal Medicine; Rheumatology  [address]  [place] [address]  Phone: [phone]  Fax: [phone]    Luis Armando Arnold), Otolaryngology  [address] [address]  Phone: [phone]  Fax: [phone]

## 2017-08-21 NOTE — PROGRESS NOTE ADULT - ASSESSMENT
58 y/o M with B/l Carotid dissection post scuba diving    Neuro: Neuro checks q1h, cont effexor, remeron,   CV: HD stable BP goal 100-140,  ASA 81 qd, hep gtt for goal of therapeutic ptt, Lisinopril, labetalol prn  Pulm: Satting well on RA.   GI/FEN: NPO except meds for possible OR today. hyponatremia, unclear etiology, Ur Na 70s, Ur Osm 400's. -- pt appears euvolemic at this time, ?possible SIADH? will cont monitor.   : Voids Flomax   ID: NONE  Endo: ISS synthroid  ppx: on Heparin gtts for therapeutic ptt.   Lines: PIV 58 y/o M with B/l Carotid dissection post scuba diving    Neuro: Neuro checks q1h, cont effexor, remeron,   CV: HD stable BP goal 100-140,  ASA 81 qd, hep gtt for goal of therapeutic ptt, Lisinopril, labetalol prn  Pulm: Satting well on RA.   GI/FEN: NPO except meds for possible OR today. hyponatremia, unclear etiology, Ur Na 72, Ur Osm 491. -- pt appears euvolemic at this time, ?possible SIADH? will cont monitor.   : Voids Flomax   ID: NONE  Endo: ISS synthroid  ppx: on Heparin gtts for therapeutic ptt.   Lines: PIV

## 2017-08-21 NOTE — DISCHARGE NOTE ADULT - NS AS DC STROKE ED MATERIALS
Need for Followup After Discharge/Stroke Education Booklet/Risk Factors for Stroke/Stroke Warning Signs and Symptoms/Call 911 for Stroke/Prescribed Medications

## 2017-08-21 NOTE — MEDICAL STUDENT ADULT H&P (EDUCATION) - NS MD HP STUD SOCIAL HX FT
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 Pt is a former smoker ([id number]), previously smoked 1/2 pack a day for 15 years.

## 2017-08-21 NOTE — MEDICAL STUDENT ADULT H&P (EDUCATION) - NS MD HP STUD PE NEURO FT
Pt is alert and oriented to person, place, day, date, time. He has a good fund of knowledge. Repeat and recall after 5 minutes 3/3. Able to spell world forward and backwards. Able to name the days of the week backwards. Naming intact 3/3 high and low frequency words. Language fluent.   CN exam:  II: PERRLA  III/IV/VI: visual fields full to confrontation, EOMI without nystagmus  V1-V3: intact to sensation bilaterally and jaw clench  VII: symmetrical smile and eyebrow raise. Able to tightly close eyes   VIII: intact to finger rub  IX/X: Uvula and tongue midline  XI: SCM intact to resistance  XII: tongue movement side to side intact    Motor:  5/5 UE: arms, forearms, wrists, fingers b/l  5/5 LE: Legs, lower legs, feet b/l    Sensation  intact to light touch, cold and vibration (slightly delayed on vibration) b/l    DTR:  2+ globally CN exam:  II: PERRLA  III/IV/VI: visual fields full to confrontation, EOMI without nystagmus  V1-V3: intact to sensation bilaterally and jaw clench  VII: symmetrical smile and eyebrow raise. Able to tightly close eyes   VIII: intact to finger rub  IX/X: Uvula and tongue midline  XI: SCM intact to resistance  XII: tongue movement side to side intact  Cerebellar: intact finger-nose-finger  proprioception intact   no pronator drift  gait exam deferred   Motor:  5/5 UE: arms, forearms, wrists, fingers b/l  5/5 LE: Legs, lower legs, feet b/l  Sensation  intact to light touch, cold and vibration (slightly delayed on vibration) b/l  DTR:  2+ globally

## 2017-08-21 NOTE — DISCHARGE NOTE ADULT - PLAN OF CARE
Stabilize and heal dissected arteries. Activity: No strenuous activity. Avoid neck manipulation with massage or chiropractor. Diet: Regular.  New medication: Xarelto 15mg 2x/day for 3 weeks, then start 20mg daily. Prescriptions sent to your pharmacy. No refills. See Dr Pinto for refills. Follow up Dr Pinto in 1 - 2 weeks after discharge. Office: [phone].  Follow up with rheumatologist Dr Lea Landeros: She will have all your lab tests. Follow up with your ENT Dr Luis Armando Arnold. Activity: No strenuous activity. Avoid neck manipulation with massage or chiropractor. Diet: Regular.  New medication: Xarelto 15mg 2x/day for 3 weeks, then start 20mg daily. Prescriptions sent to your pharmacy. No refills. See Dr Pinto for refills. Follow up Dr Pinto in 1 - 2 weeks after discharge. Office: [phone]. Return to hospital if any neurological symptoms develop.    Follow up with rheumatologist Dr Lea Landeros: She will have all your lab tests. Follow up with your ENT Dr Luis Armando Arnold.

## 2017-08-21 NOTE — PROGRESS NOTE ADULT - SUBJECTIVE AND OBJECTIVE BOX
cc: Patient is a 57y old  Male who presents with a chief complaint of Headache - h/o HTN - found to have bilateral internal carotid artery dissection    HPI:  57yom - h/o chronic fatigue syndrome, depression, HTN - sent to ED by ENT ([place]) for elevated blood pressure and headache.  Of note, hyperbaric oxygen 1 week ago at [facility] for suspected bends after scuba diving 3 weeks ago in [facility].  Following scuba diving patient experienced headache, nausea, unstable gait and inability to concentrate.  Following hyperbarix oxygen most symptoms improved, however, headache continued.  Headache improved with excedrin.  Headache described as throbbing.  Sent to er today for continued headache and bp elevations.  Now found to have bilateral carotid artery dissection.  Denies chest pain, dyspnea, palpitations, dizziness, lightheadedness, near-syncope, or syncope. No focal weakness.  Currently without acute complaints.  Patient now in SICU.      interval - headache continues without significant change; no cp    PAST MEDICAL & SURGICAL HISTORY:  HTN (hypertension)  Chronic fatigue syndrome  Depression  History of back surgery: lumbar spine  H/O hernia repair    MEDICATIONS  (STANDING):  lisinopril 40 milliGRAM(s) Oral daily  venlafaxine XR. 300 milliGRAM(s) Oral daily  insulin lispro (HumaLOG) corrective regimen sliding scale   SubCutaneous Before meals and at bedtime  dextrose 5%. 1000 milliLiter(s) (50 mL/Hr) IV Continuous <Continuous>  dextrose 50% Injectable 12.5 Gram(s) IV Push once  dextrose 50% Injectable 25 Gram(s) IV Push once  dextrose 50% Injectable 25 Gram(s) IV Push once  levothyroxine 12.5 MICROGram(s) Oral daily  aspirin  chewable 81 milliGRAM(s) Oral daily  mirtazapine 60 milliGRAM(s) Oral at bedtime  tamsulosin 0.4 milliGRAM(s) Oral at bedtime  heparin  Infusion 1300 Unit(s)/Hr (13 mL/Hr) IV Continuous <Continuous>    MEDICATIONS  (PRN):  labetalol Injectable 10 milliGRAM(s) IV Push every 6 hours PRN sbp> 140  dextrose Gel 1 Dose(s) Oral once PRN Blood Glucose LESS THAN 70 milliGRAM(s)/deciliter  glucagon  Injectable 1 milliGRAM(s) IntraMuscular once PRN Glucose LESS THAN 70 milligrams/deciliter  hydrALAZINE Injectable 10 milliGRAM(s) IV Push every 6 hours PRN sbp> 140  ketorolac   Injectable 30 milliGRAM(s) IV Push every 6 hours PRN Severe Pain (7 - 10)      FAMILY HISTORY: father with mi leading to death at 39 yo     SOCIAL HISTORY: 1/2ppd smoking for approximately 15 years - quit 10 yrs ago    ICU Vital Signs Last 24 Hrs  T(C): 36.6 (21 Aug 2017 18:25), Max: 36.6 (21 Aug 2017 18:25)  T(F): 97.9 (21 Aug 2017 18:25), Max: 97.9 (21 Aug 2017 18:25)  HR: 76 (21 Aug 2017 18:00) (64 - 102)  BP: 152/94 (21 Aug 2017 18:00) (122/70 - 155/97)  BP(mean): 117 (21 Aug 2017 18:00) (90 - 121)  ABP: --  ABP(mean): --  RR: 14 (21 Aug 2017 18:00) (11 - 42)  SpO2: 98% (21 Aug 2017 18:00) (96% - 99%)      PHYSICAL EXAM:  Constitutional:nad; supine in bed    Eyes: clear sclerae    ENMT:no obvious nasal congestion    Neck no bruit heard    Respiratory: breath sounds clear; no accessory muscle use    Cardiovascular:rr; s1/s2 present; no harsh murmur    Gastrointestinal:soft abd; nd    Extremities:no edema bilat le     Vascular:bilat le pulses present    Neurological:moves 4 ext    Skin:warm    Psychiatric:no acute agitation    EC2017 ecg reviewed    LABS:                        14.5   9.4   )-----------( 283      ( 21 Aug 2017 04:24 )             41.1       130<L>  |  95<L>  |  15  ----------------------------<  107<H>  4.3   |  24  |  0.80    Ca    9.3      21 Aug 2017 18:05  Phos  3.1       Mg     2.0         TPro  7.8  /  Alb  4.2  /  TBili  0.2  /  DBili  x   /  AST  27  /  ALT  35  /  AlkPhos  71        RADIOLOGY & ADDITIONAL STUDIES:  < from: CT Angio Abdomen and Pelvis w/ IV Cont (17 @ 11:56) >  EXAM:  CT ANGIO ABD PELV (W)AW IC                          PROCEDURE DATE:  2017    Quantity of Contrast in Vial in ml: 125 Contrast Used: Optiray 350  Quantity of Contrast Wasted in ml: 5           INTERPRETATION:  CLINICAL INDICATION: 37-year-old with carotid artery   dissection.        FINDINGS: CT angiography of the abdomen and pelvis was performed after   the administration of intravenous contrast. Multiplanar and maximum   intensity projection 3-D reconstructions were performed in the sagittal   and coronal planes. Reference is made to prior CT angiography of the neck   dated 2017.    Evaluation of the lower chest demonstrates normal heart size. Lower lung   parenchyma is unremarkable. Evaluation of the abdomen demonstratesthat   the liver, gallbladder, spleen, pancreas, are unremarkable. There is mild   edematous infiltration surrounding the left adrenal gland and to lesser   extent surrounding the right adrenal gland. Bilateral kidneys are normal   in appearance. Evaluation of the gastrointestinal tract demonstrates no   bowel thickening or bowel obstruction with normal appearance of the   appendix. Post surgical changes of the lower abdominal wall. Evaluation   pelvis demonstrates that the bladder is distended with fluid and rises   into the lower abdomen and consideration to catheterization is   recommended. The prostate gland and seminal vesicles are normal in   appearance. There are small bilateral fat-containing inguinal hernias.   There is no free fluid. No adenopathy. Evaluation of the osseous   structures demonstrates posterior lumbar fusion spanning from L4 to L5   and no destructive osseous lesion.    Evaluation of the vasculature demonstrates minimal vascular   calcification. There is aneurysmal dilatation and complex dissection flap   within the celiac artery measuring maximally 1.9 cm with peripheral   calcification, consistent with chronic nature. Superior mesenteric   artery, inferior mesenteric artery and bilateral renal arteries are   widely patent without stenosis, aneurysm or dissection. Negative for   aortic aneurysm, dissection or stenosis. There is saccular aneurysmal   dilatation of the right common iliac artery measuring 2.2 cm with   peripheral calcification. The left common iliac artery, bilateral   external iliac arteries and bilateral internal iliac arteries are normal   in appearance. Splenic, hepatic and gastroduodenal arteries are normal in   appearance.        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            "Thank you for the opportunity to participate in the care of this   patient."        REFUGIO ORR M.D., ATTENDING RADIOLOGIST  This document has been electronically signed. Aug 21 2017 12:09PM        < end of copied text >      < from: CT Angio Neck w/ IV Cont (17 @ 17:52) >  EXAM:  CT ANGIO BRAIN (W)AW IC                          EXAM:  CT ANGIO NECK (W)AW IC                          PROCEDURE DATE:  2017     67  Optiray 350   5           INTERPRETATION:  CT ANGIOGRAM OF THE CERVICAL ARTERIES   CTA OF THE [place]:    INDICATION: Headache after scuba diving 3 weeks prior.    TECHNIQUE:     CTA [place]:     After the intravenous power injection of non-ionic contrast material,   serial thin sections were obtained through the intracranial circulation   on a multislice CT scanner followed by multiplanar 3-D reformations.   There are no prior studies.    CTA NECK:    After the intravenous power injection of non-ionic contrast material,   serial thin sections were obtained through the cervical circulation on a   multislice CT scanner followed by multiplanar 3-D reformations. There are   no prior studies.    FINDINGS:    CTA [place]:    Posterior circulation demonstrates a hypoplastic right vertebral artery   that predominantly endsin PICA, normal variant. The distal left   vertebral artery and basilar artery are unremarkable. There is slight   ectasia of the basilar artery. Patient demonstrates bilateral fetal   origin PCAs with hypoplastic P1 segments bilaterally, normal variant. No   significant stenosis is seen in the intracranial posterior circulation.    No stenosis is seen in the MELCH[place] bilaterally or MCA bilaterally. The   anterior communicating artery is hypoplastic, normal variant. No stenosis   is seen in the cavernous or supraclinoid ICA bilaterally..    CTA NECK:    Origins of the left subclavian artery, left common carotid artery, right   innominate artery, right subclavian artery, right common carotid artery   are normal. Left vertebral artery origin is patent. Left vertebral artery   is dominant. The right vertebral artery origin is patent. The right   vertebral artery is hypoplastic and predominantly ends in PICA, normal   variant.     At the carotid bifurcation, there is no significant stenosis.    There is marked abnormality of the left internal carotid artery in its   cervical portion, in which there is an abrupt change in caliber at   approximately the C3 level with a suggestion of an intimal flap   consistent with ICA dissection, and there is an area of flow-related   enhancement projecting lateral to the lumen consistent with   pseudoaneurysm. Beyond this area of abnormality, the caliber to the upper   cervical left ICA to the proximal petrous left ICA is thinner in caliber   but there isgood flow seen in the upper cervical left ICA and into the   left petrous ICA where the vessel assumes a more normal caliber in the   mid and distal left petrous ICA. Proximal to this dissection, the lumen   has a slightly nodular appearance suggestive of fibromuscular dysplasia   or mixed connective tissue disorder, which may be an etiology for the   dissection.    There is marked abnormality of the right internal carotid artery just   past its origin in which the lumen has a nodular appearance,which   suggests fibromuscular dysplasia or mixed connective tissue disorder. In   addition, in the upper right cervical ICA there is  an area of linear low   attenuation within the lumen at the C1-C2 level consistent with an   intimal flap of dissection. Medially, there is focal area of flow-related   enhancement suggestive of pseudoaneurysm. Beyond this there is thinner   caliber to the uppermost cervical right ICA extending into the proximal   right petrous ICA segment, which is not flow-limiting, and there is a   more normal caliber at to the mid and distal right petrous ICA.    There is multilevel degenerative disc disease in which findings loss of   disc space height and endplate sclerosis particularly at C5-C6 and C6-C7   levels. Thereappears be a left paracentral hypertrophic foraminal disc   protrusion at the C6-C7 level and a central disc protrusion at the C5-C6   level. There is multilevel degenerative osteoarthritis. Findings include   endplate osteophytosis and uncovertebralspurring. This results in   multilevel foraminal narrowing particularly at the C3-C4 level   bilaterally; left-sided at the C4-C5 level; bilaterally at the C5-C6   level; and left-sided at the C6-C7 level.    IMPRESSION:       CTA COW:      1. No intracranial stenosis of the [place].  2. Normal variants of anatomy including bilateral fetal origin PCAs and   hypoplastic right vertebral artery predominantly ending in PICA.    CTA NECK:    1. Bilateral ICA dissections with pseudoaneurysm formation bilaterally   and areas of concentric narrowing of the upper most cervical ICA   bilaterally, left more than right, extending into the proximal petrous   ICA bilaterally but there is good flow past the area of dissections   bilaterally and a more normal caliber to the mid and distal petrous ICA   bilaterally.  2. Nodularity to the ICA lumen bilaterally proximal to the dissection   suggest underlying mixed connective tissue disorder such as fibromuscular   dysplasia, which may be the underlying etiology for the bilateral   dissections.   3. Multilevel degenerative disc disease and osteoarthritis, particularly   at the C5-C6 and C6-C7 levels.  4. Hypoplastic right vertebral artery predominantly ends in PICA, normal   variant.    Results were discussed with Dr. Miller at the time of dictation.            "Thank you for the opportunity to participate in the care of this   patient."        SONYA NAVA M.D., ATTENDING RADIOLOGIST  This document has been electronically signed. Aug 20 2017  6:26PM        < end of copied text >  < from: CT Head No Cont (17 @ 17:51) >  EXAM:  CT BRAIN                          PROCEDURE DATE:  2017                     INTERPRETATION:  INDICATIONS: Headache after scuba diving 3 weeks prior.    TECHNIQUE:  Serial axial images were obtained from the skull base to the   vertex without the use of intravenous contrast.    COMPARISON EXAMINATION: None.    FINDINGS:    VENTRICLES AND SULCI: Parenchymal volume loss is present which is   commensurate with patient age.   INTRA-AXIAL: No intracranial mass, acute hemorrhage, or midline shift is   present. Gray-white differentiation is preserved.  EXTRA-AXIAL: No extra-axial fluid collection is present.   VISUALIZED SINUSES: No air-fluid levels are identified. There is minimal   mucosal thickening in the ethmoid air cells bilaterally. VISUALIZED   MASTOIDS:  There is minor opacification within the mastoid air cells   bilaterally.  CALVARIUM: No fracture is seen.    IMPRESSION:    No CT evidence of acute intracranial hemorrhage and no apparent acute   abnormality.            "Thank you for the opportunity to participate in the care of this   patient."        SONYA NAVA M.D., ATTENDING RADIOLOGIST  This document has been electronically signed. Aug 20 2017  5:49PM    < end of copied text >

## 2017-08-21 NOTE — MEDICAL STUDENT ADULT H&P (EDUCATION) - NS MD HP STUD RESULTS RAD FT
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      CT Angio Neck with IV Contrast 8/20/17 IMPRESSION:     CTA COW:    1. No intracranial stenosis of the [place].  2. 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    CTA NECK:  1. 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  2. Nodularity to the ICA lumen bilaterally proximal to the dissection suggest underlying mixed connective tissue disorder such as fibromuscular dysplasia, which may be the underlying etiology for the bilateral dissections.   3. Multilevel degenerative disc disease and osteoarthritis, particularly at the C5-C6 and C6-C7 levels.  4. 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      CT Angio Neck with IV Contrast 8/20/17 IMPRESSION:     CTA COW:    1. No intracranial stenosis of the [place].  2. 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    CTA NECK:  1. 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  2. Nodularity to the ICA lumen bilaterally proximal to the dissection suggest underlying mixed connective tissue disorder such as fibromuscular dysplasia, which may be the underlying etiology for the bilateral dissections.   3. Multilevel degenerative disc disease and osteoarthritis, particularly at the C5-C6 and C6-C7 levels.  4. 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

## 2017-08-21 NOTE — CONSULT NOTE ADULT - PROBLEM SELECTOR RECOMMENDATION 2
Abnormal CT findings of aneurysmal dilatation and Bilaeteral carotid dissection.   Will obtain CT abdomen/pelvis with IV contrast to assess renal and celiac vessels.  DIXIE, Rheumatoid factor, Abnormal CT findings of aneurysmal dilatation and Bilaeteral carotid dissection.   Will obtain CT abdomen/pelvis with IV contrast to assess renal and celiac vessels.  DIXIE, Rheumatoid factor  Consider rheumatology evaluation  Bilateral renal sonogram Abnormal CT findings of aneurysmal dilatation and Bilaeteral carotid dissection.   Will obtain CT abdomen/pelvis with IV contrast to assess renal and celiac vessels.  DIXIE, Rheumatoid factor, Work up for Mixed connective tissue disorder, RPR screen, Homocysteine level, Alpha 1 Antitrysin level, ESR level  Consider rheumatology evaluation for further work up.  Bilateral renal sonogram

## 2017-08-21 NOTE — DISCHARGE NOTE ADULT - MEDICATION SUMMARY - MEDICATIONS TO TAKE
I will START or STAY ON the medications listed below when I get home from the hospital:    hydrocortisone 5 mg oral tablet  --  by mouth once a day  -- Indication: For Home medication    aspirin 81 mg oral tablet, chewable  -- 1 tab(s) by mouth once a day  -- Indication: For Artery Dissection    Altace 10 mg oral capsule  -- 1 cap(s) by mouth once a day  -- Indication: For Hypertension    Flomax 0.4 mg oral capsule  -- 1 cap(s) by mouth once a day  -- Indication: For BPH    rivaroxaban 15 mg oral tablet  -- 1 tab(s) by mouth 2 times a day  -- Indication: For Artery Dissection    Effexor  -- 300  by mouth once a day  -- Indication: For Depression    Rexulti 1 mg oral tablet  -- 1 tab(s) by mouth once a day  -- Indication: For Depression    hydroCHLOROthiazide  --  by mouth   -- Indication: For Hypertension    Cytomel  -- 12.5 microgram(s) by mouth once a day  -- Indication: For Home Medication    Synthroid  -- 12.5 microgram(s) by mouth once a day  -- Indication: For Hypothyroid I will START or STAY ON the medications listed below when I get home from the hospital:    hydrocortisone 5 mg oral tablet  --  by mouth once a day  -- Indication: For Home medication    aspirin 81 mg oral tablet, chewable  -- 1 tab(s) by mouth once a day  -- Indication: For Artery Dissection    Tylenol 500 mg oral tablet  -- 1 tab(s) by mouth every 4 - 6 hours for  pain.  -- Indication: For Pain    Altace 10 mg oral capsule  -- 1 cap(s) by mouth once a day  -- Indication: For Hypertension    Flomax 0.4 mg oral capsule  -- 1 cap(s) by mouth once a day  -- Indication: For BPH    rivaroxaban 15 mg oral tablet  -- 1 tab(s) by mouth 2 times a day  -- Indication: For Artery Dissection    Effexor  -- 300  by mouth once a day  -- Indication: For Depression    Rexulti 1 mg oral tablet  -- 1 tab(s) by mouth once a day  -- Indication: For Depression    hydroCHLOROthiazide  --  by mouth   -- Indication: For Hypertension    Cytomel  -- 12.5 microgram(s) by mouth once a day  -- Indication: For Home Medication    Synthroid  -- 12.5 microgram(s) by mouth once a day  -- Indication: For Hypothyroid

## 2017-08-21 NOTE — MEDICAL STUDENT ADULT H&P (EDUCATION) - NS MD HP STUD PE VITALS FT
O: ICU Vital Signs Last 24 Hrs  T(F): 97.1 (08-21-17 @ 06:33), Max: 98.6 (08-20-17 @ 15:01)  HR: 82 (08-21-17 @ 08:00) (64 - 102)  BP: 128/81 (08-21-17 @ 08:00) (128/81 - 155/97)  BP(mean): 96 (08-21-17 @ 08:00) (93 - 121)  RR: 12 (08-21-17 @ 08:00) (11 - 42)  SpO2: 98% (08-21-17 @ 08:00) (96% - 98%)

## 2017-08-21 NOTE — MEDICAL STUDENT ADULT H&P (EDUCATION) - NS MD HP STUD ASPLAN PLAN FT
Problem 1: Bilateral carotid artery dissections  BP control (-140)  Aspirin daily  Home medication  Neuro checks q1hr  AM labs.     Problem 2:  Increased stroke risk from multiple arterial dissections.   Determine best long term stroke prevention therapy, ie, ASA 81mg, clopidogrel or warfarin    Problem 3: Determine underlying etiology of multiple arterial dissections. Problem 1: Bilateral carotid artery dissections  BP control (-140)  Aspirin daily  Home medication  Neuro checks q1hr  AM labs.   Heparin drip    Problem 2:  Increased stroke risk from multiple arterial dissections.   Determine best long term stroke prevention therapy, ie, ASA 81mg, clopidogrel or warfarin    Problem 3: Determine underlying etiology of multiple arterial dissections.   Work up for FMD and vasculitis (ie, ANCA, DIXIE, dsDNA)  Rheum consult called    Problem 4: Persistent afternoon headache  Pain management

## 2017-08-21 NOTE — MEDICAL STUDENT ADULT H&P (EDUCATION) - NS MD HP STUD PMH FT
Chronic Fatigue Syndrome: 2012. Well managed. Pt reports being able to tolerate exercise and denies any "major problems"  HTN: 2017. Pt reports that he started having high blood pressure with the onset of his headache on 8/2, and denies having HA before that.  Depression: per chart Chronic Fatigue Syndrome: 2012. Well managed. Pt reports being able to tolerate exercise and denies any "major problems"  Depression: per chart

## 2017-08-21 NOTE — MEDICAL STUDENT ADULT H&P (EDUCATION) - NS MD HP STUD PE CONSITUTIONAL FT
Pt is well appearing, sitting up in bed, in no acute distress Pt is well appearing, sitting up in bed, in no acute distress.  Pt is alert and oriented to person, place, day, date, time. He has a good fund of knowledge. Repeat and recall after 5 minutes 3/3. Able to spell world forward and backwards. Able to name the days of the week backwards. Naming intact 3/3 high and low frequency words. Language fluent.

## 2017-08-21 NOTE — PROGRESS NOTE ADULT - ASSESSMENT
A/recs:  1) bilateral internal carotid artery dissection  -note ct with aneurysmal dilatation and complex dissection flap within the celiac artery and saccular aneurysmal dilatation of the right common iliac artery per Radiology  -ac per vascular and Neurology  -agree with recommendation for Rheumatology consult  -recommend check lipid panel  2) htn - elevated bp continues - now on hydralazine, lisinopril, and labetalol   -echo not yet done - recommend echocardiogram this admission  -please check tsh, free t3 and free t4  -please check bnp  -recommend avoid labile blood pressure; po rx when ok with primary team  3) hyponatremia - appreciate renal input  4) chronic fatigue and depression - per primary team  5) K>4, Mg>2  case discussed with Vascular surgery team

## 2017-08-21 NOTE — MEDICAL STUDENT ADULT H&P (EDUCATION) - NS MD HP STUD RESULTS LAB FT
129<L>  |  93<L>  |  13  ----------------------------<  120<H>  3.8   |  22  |  0.70    Ca    9.1     Phos  3.1     Mg     2.0         TPro  7.8  /  Alb  4.2  /  TBili  0.2  /  DBili  x   /  AST  27  /  ALT  35  /  AlkPhos  71                          14.5   9.4   )-----------( 283      ( 21 Aug 2017 04:24 )             41.1   ( 20 Aug 2017 17:21 )   PT: 11.3 sec;   INR: 1.02  PTT:116.6 sec     Urinalysis Basic - ( 21 Aug 2017 07:12 )  Color: Yellow / Appearance: Clear / S.010 / pH: x  Gluc: x / Ketone: 40 mg/dL  / Bili: NEGATIVE / Urobili: 0.2 E.U./dL   Blood: x / Protein: NEGATIVE mg/dL / Nitrite: NEGATIVE   Leuk Esterase: NEGATIVE / RBC: x / WBC x   Sq Epi: x / Non Sq Epi: x / Bacteria: x

## 2017-08-21 NOTE — CONSULT NOTE ADULT - SUBJECTIVE AND OBJECTIVE BOX
I had seen Mr Haddad in he initial Rheum consult today.  He is a 57 year old man, who was admitted to ICU with severe headache and was diagnosed with several arterial dissection (b/l carotids, iliac and celiac).  He developed headache, nausea, gait instability after scuba diving on 8/2/17.  He was in a good state of health prior to it but has chronic fatigue.  His headache resolved at present.    ROS is negative for joint pain, swelling, fevers, Wt loss, cough, malaise, abdominal pains, visual disturbances, jaw claudication, weakness, paraesthesias, confusion, dysarthria.  He has 2 hyperextensible joints only.  He is 6'2" but no history of palpitations, lens dislocation, aortic abnormalities.    PMH: Chronic fatigue syndrome, depression, HTN.     PSH: history of hernia repair and L-spine Sx.    MEDICATIONS  (STANDING):  lisinopril 40 milliGRAM(s) Oral daily  venlafaxine XR. 300 milliGRAM(s) Oral daily  insulin lispro (HumaLOG) corrective regimen sliding scale   SubCutaneous Before meals and at bedtime  dextrose 5%. 1000 milliLiter(s) (50 mL/Hr) IV Continuous <Continuous>  dextrose 50% Injectable 12.5 Gram(s) IV Push once  dextrose 50% Injectable 25 Gram(s) IV Push once  dextrose 50% Injectable 25 Gram(s) IV Push once  levothyroxine 12.5 MICROGram(s) Oral daily  aspirin  chewable 81 milliGRAM(s) Oral daily  mirtazapine 60 milliGRAM(s) Oral at bedtime  tamsulosin 0.4 milliGRAM(s) Oral at bedtime  heparin  Infusion 1500 Unit(s)/Hr (15 mL/Hr) IV Continuous <Continuous>    MEDICATIONS  (PRN):  labetalol Injectable 10 milliGRAM(s) IV Push every 6 hours PRN sbp> 140  dextrose Gel 1 Dose(s) Oral once PRN Blood Glucose LESS THAN 70 milliGRAM(s)/deciliter  glucagon  Injectable 1 milliGRAM(s) IntraMuscular once PRN Glucose LESS THAN 70 milligrams/deciliter  hydrALAZINE Injectable 10 milliGRAM(s) IV Push every 6 hours PRN sbp> 140  ketorolac   Injectable 30 milliGRAM(s) IV Push every 6 hours PRN Severe Pain (7 - 10)  acetaminophen 325 mG/butalbital 50 mG/caffeine 40 mG 1 Tablet(s) Oral every 6 hours PRN migraine headache    Allergies    No Known Allergies    Intolerances    FH: no systemic inflammatory disorders.    SH: former smoker.    ICU Vital Signs Last 24 Hrs  T(C): 36.6 (21 Aug 2017 18:25), Max: 36.6 (21 Aug 2017 18:25)  T(F): 97.9 (21 Aug 2017 18:25), Max: 97.9 (21 Aug 2017 18:25)  HR: 82 (21 Aug 2017 20:00) (64 - 102)  BP: 120/70 (21 Aug 2017 20:00) (118/66 - 155/97)  BP(mean): 89 (21 Aug 2017 20:00) (82 - 121)  ABP: --  ABP(mean): --  RR: 14 (21 Aug 2017 20:00) (11 - 42)  SpO2: 96% (21 Aug 2017 20:00) (96% - 99%)    PHYSICAL EXAM:      Constitutional: well developed, pleasant.  Ht 6'2"    Eyes: clear    ENMT: ANDREW AOMI    Neck: supple, No JVD    Respiratory:  Clear to auscultition, mild pectus excavatum    Cardiovascular: RR, S1S2    Gastrointestinal: Soft, NT, ND    Extremities: No clubbing no cyanosis.    Skin: clear    Lymph Nodes: no cervical, axillary or inguinal adenopathy    Musculoskeletal: no synovitis, no joint deformities. + B/L IP joint hyperextensibility only. Negative: tongue to nose, thumb to wrist, thumb to pinkie tests.    Psychiatric: A&O x 3    CBC Full  -  ( 21 Aug 2017 04:24 )  WBC Count : 9.4 K/uL  Hemoglobin : 14.5 g/dL  Hematocrit : 41.1 %  Platelet Count - Automated : 283 K/uL  Mean Cell Volume : 84.7 fL  Mean Cell Hemoglobin : 29.9 pg  Mean Cell Hemoglobin Concentration : 35.3 g/dL  Auto Neutrophil # : x  Auto Lymphocyte # : x  Auto Monocyte # : x  Auto Eosinophil # : x  Auto Basophil # : x  Auto Neutrophil % : x  Auto Lymphocyte % : x  Auto Monocyte % : x  Auto Eosinophil % : x  Auto Basophil % : x    08-21    130<L>  |  95<L>  |  15  ----------------------------<  107<H>  4.3   |  24  |  0.80    Ca    9.3      21 Aug 2017 18:05  Phos  3.1     08-21  Mg     2.0     08-21    TPro  7.8  /  Alb  4.2  /  TBili  0.2  /  DBili  x   /  AST  27  /  ALT  35  /  AlkPhos  71  08-20    CT a/p: 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    CTH: NL.    CTA neck: B/L ICA dissections with pseudoaneurysm formation B/L with patterns typical for fibromuscular dysplasia.    Impression:  1. Multiple vessel dissection after scuba diving.  2. Most likely fibromuscular dysplasia but I will rule out systemic vasculitis (GCA, rheumatoid) but it is not likely due to absence of systemic symptoms.  3. He does not have typical Marfan syndrome but I recommend genetic testing for collagen-vascular disorders.    PLAN:  1. Immunology labs and inflammatory markers (ordered).  2. Treatment as per Neuro and Vascular team.  3. Consider genetic testing.  4. I will see Mr. Haddad in my office in 3-4 weeks for re-evaluation and lab results review.      No additional treatment is necessary from Rheum standpoint at present.

## 2017-08-21 NOTE — MEDICAL STUDENT ADULT H&P (EDUCATION) - NS MD HP STUD MEDICATIONS FT
lisinopril 40 milliGRAM(s) Oral daily  venlafaxine XR. 300 milliGRAM(s) Oral daily  insulin lispro (HumaLOG) corrective regimen sliding scale   SubCutaneous Before meals and at bedtime  levothyroxine 12.5 MICROGram(s) Oral daily  aspirin  chewable 81 milliGRAM(s) Oral daily  mirtazapine 60 milliGRAM(s) Oral at bedtime  tamsulosin 0.4 milliGRAM(s) Oral at bedtime  heparin  Infusion 1300 Unit(s)/Hr (13 mL/Hr) IV Continuous <Continuous>  potassium chloride    Tablet ER 20 milliEquivalent(s) Oral once    MEDICATIONS  (PRN):  labetalol Injectable 10 milliGRAM(s) IV Push every 6 hours PRN sbp> 140  dextrose Gel 1 Dose(s) Oral once PRN Blood Glucose LESS THAN 70 milliGRAM(s)/deciliter  glucagon  Injectable 1 milliGRAM(s) IntraMuscular once PRN Glucose LESS THAN 70 milligrams/deciliter  hydrALAZINE Injectable 10 milliGRAM(s) IV Push every 6 hours PRN sbp> 140  ketorolac   Injectable 30 milliGRAM(s) IV Push every 6 hours PRN Severe Pain (7 - 10)

## 2017-08-21 NOTE — MEDICAL STUDENT ADULT H&P (EDUCATION) - NS MD HP STUD ASPLAN ASSES FT
The patient is a 57 year old man with a history of smoking who presented to the ED with a 3 week history of headache. The patient reported that the headache started after 2 shallow SCUBA diving dives, The patient is a 57 year old man with a history of smoking who presented to the ED with a 2.5 week history of headache. The patient reported that the headache started after 2 shallow SCUBA dives on 8/2/17, and that it comes back everyday in the late afternoon as an 8/10 throbbing pain. He said that he takes 3-4 Excedrin tabs at the onset of HA with full resolution. The patient reported associated symptoms of nausea and gait instability on 8/2 that resolved with six hours of hyperbaric chamber treatment on 8/14. The patient also reported new onset high blood pressure that has persisted along with the HA since 8/2. Neurologic exam was unremarkable for focal deficits. Physical exam was unremarkable, including an absence of carotid bruits. 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 The patient is a 57 year old man with a history of smoking who presented to the ED with a 2.5 week history of headache. The patient reported that the headache started after 2 shallow SCUBA dives on 8/2/17, and that he has a slight headache throughout the day (2/10) and a more severe headache (8/10) each day in the late afternoon with a throbbing pain. He said that he takes 3-4 Excedrin tabs at the onset of HA with full resolution. The patient reported associated symptoms of nausea and gait instability on 8/2 that resolved with six hours of hyperbaric chamber treatment on 8/14. The patient also reported new onset high blood pressure that has persisted along with the HA since 8/2. Pt denies numbess, tingling, memory loss, weakness, history of DM or HLD. Neurologic exam was unremarkable for focal deficits. Physical exam was unremarkable, including an absence of carotid bruits. 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

## 2017-08-21 NOTE — CONSULT NOTE ADULT - SUBJECTIVE AND OBJECTIVE BOX
Patient is a 57y old  Male who presents with a chief complaint of Headache (20 Aug 2017 21:44)      HPI:  Pt is a 58yo M PMHx chronic fatigue syndrome, depression, HTN who presents to [facility] ED with headache since 2017. Pt  went for scuber diving  and afterwards developed headache, nausea, unstable gait and inability to concentrate. He saw his PCP who referred him to ENT specialist. ENT physician recommended hyperbaric chamber which Pt received 6hrs of treatment on 16. Afterwards unstable gait and nausea resolved but headache persisted. During the headache episode, Pt is also unable to concentrated. Headache is described as throbbing and typically occurs in the evening and last about 5hrs. Pt has being taken excedrin which helped to relieve the headache. From 17, Pt denies blurry vision, dizziness, weakness, numbness, tingling, memory lost, change in speech, confusion, slurred speech, facial/eye drooping, CP/SOB.  ENT physician advised Pt to come to [facility] ED further evaluation. At the ED CTA head/neck was performed which revealed b/l carotid dissection. (20 Aug 2017 21:44). Patient stats that he has low flow of prostate and take flomax but no other kidney disease. Patient stats that he has been having low Na in the past but no prior work up done as per patient.     PAST MEDICAL & SURGICAL HISTORY:  HTN (hypertension)  Chronic fatigue syndrome  Depression  History of back surgery: lumbar spine  H/O hernia repair      Allergies    No Known Allergies    Intolerances        FAMILY HISTORY:  Father  of Heart disease at 40's      SOCIAL HISTORY:  Ex smoker quit few years ago  Denies any ETOH/Drugs    MEDICATIONS  (STANDING):  lisinopril 40 milliGRAM(s) Oral daily  venlafaxine XR. 300 milliGRAM(s) Oral daily  insulin lispro (HumaLOG) corrective regimen sliding scale   SubCutaneous Before meals and at bedtime  dextrose 5%. 1000 milliLiter(s) (50 mL/Hr) IV Continuous <Continuous>  dextrose 50% Injectable 12.5 Gram(s) IV Push once  dextrose 50% Injectable 25 Gram(s) IV Push once  dextrose 50% Injectable 25 Gram(s) IV Push once  levothyroxine 12.5 MICROGram(s) Oral daily  aspirin  chewable 81 milliGRAM(s) Oral daily  mirtazapine 60 milliGRAM(s) Oral at bedtime  tamsulosin 0.4 milliGRAM(s) Oral at bedtime  heparin  Infusion 1300 Unit(s)/Hr (13 mL/Hr) IV Continuous <Continuous>    MEDICATIONS  (PRN):  labetalol Injectable 10 milliGRAM(s) IV Push every 6 hours PRN sbp> 140  dextrose Gel 1 Dose(s) Oral once PRN Blood Glucose LESS THAN 70 milliGRAM(s)/deciliter  glucagon  Injectable 1 milliGRAM(s) IntraMuscular once PRN Glucose LESS THAN 70 milligrams/deciliter  hydrALAZINE Injectable 10 milliGRAM(s) IV Push every 6 hours PRN sbp> 140  ketorolac   Injectable 30 milliGRAM(s) IV Push every 6 hours PRN Severe Pain (7 - 10)      REVIEW OF SYSTEMS:    CONSTITUTIONAL: No fever, weight loss, or fatigue or chills  EYES: No eye pain, visual disturbances, or discharge, no double vision   ENMT: Dizziness and headache,  No difficulty hearing,  vertigo; No sinus or throat pain or neck pain  RESPIRATORY: No cough, wheezing, chills or hemoptysis; No shortness of breath  CARDIOVASCULAR: No chest pain, shortness of breath, palpitations, dizziness or syncope, No leg edema  GASTROINTESTINAL: No abdominal or epigastric pain. No nausea, vomiting, or hematemesis; No diarrhea or constipation. No melena or hematochezia.  GENITOURINARY: No dysuria, frequency, hematuria, or incontinence  NEUROLOGICAL: Headache and lack of sleep and difficulty concentrating. Denies any memory loss, loss of strength, numbness, or tremors  SKIN: No itching, burning, rashes, or lesions   LYMPH NODES: No enlarged glands  ENDOCRINE: No heat or cold intolerance; No hair loss  MUSCULOSKELETAL: No joint pain or swelling; No muscle, back, or extremity pain  PSYCHIATRIC: No depression, anxiety, mood swings, or difficulty sleeping  HEME/LYMPH: No easy bruising, or bleeding gums      PHYSICAL EXAM:  GENERAL: NAD, well-developed, well nourished, alert, awake and apprehensive in no acute distress at present  HEAD:  Atraumatic, Normocephalic,   EYES: EOMI, ANDREW, conjunctiva and sclera clear, no nystagmus  Oral cavity: Oral mucosa dry and pink, no oral lesions or ulcers  NECK: Neck supple, No JVD, no palpable thyromegaly,  CHEST/LUNG: Clear to auscultation bilaterally; No wheeze, no rales, no crepitations  HEART: Regular rate and rhythm. MT II/VI at LPSB, No gallop, no rub   ABDOMEN: Soft, Nontender, Nondistended; Bowel sounds present, no guarding, no rigidity, no rebound tenderness, No flank or CVA angle tenderness, no palpable or pulsatile mass noted. No renal bruits.   EXTREMITIES:  No clubbing, cyanosis, or edema, no calf tenderness  Neurology: AAOx3, no focal neurological deficit. Motor 5/5 all 4 extremities. sensory intact. cranial nerves II to XII grossly intact. Able to comprehend and answers all questions appropriately. No neck stiffness.   SKIN: No rashes or lesions      CAPILLARY BLOOD GLUCOSE  117 (21 Aug 2017 06:00)          I&O's Summary    20 Aug 2017 07:  -  21 Aug 2017 07:00  --------------------------------------------------------  IN: 1106 mL / OUT: 600 mL / NET: 506 mL    21 Aug 2017 07:  -  21 Aug 2017 12:17  --------------------------------------------------------  IN: 52 mL / OUT: 0 mL / NET: 52 mL          LABS:                                                   17 @ 04:24    129<L>  |  93<L>  |  13  ----------------------------<  120<H>  3.8   |  22  |  0.70                                                 17 @ 16:25    133<L>  |  95<L>  |  15  ----------------------------<  103<H>  4.7   |  27  |  0.80    Ca    9.1      21 Aug 2017 04:24  Ca    10.2      20 Aug 2017 16:25  Phos  3.1       Mg     2.0         TPro  7.8  /  Alb  4.2  /  TBili  0.2  /  DBili  x   /  AST  27  /  ALT  35  /  AlkPhos  71                            14.5   9.4   )-----------( 283      ( 21 Aug 2017 04:24 )             41.1     CBC Full  -  ( 21 Aug 2017 04:24 )  WBC Count : 9.4 K/uL  Hemoglobin : 14.5 g/dL  Hematocrit : 41.1 %  Platelet Count - Automated : 283 K/uL  Mean Cell Volume : 84.7 fL      CBC Full  -  ( 21 Aug 2017 01:33 )  WBC Count : 9.5 K/uL  Hemoglobin : 14.5 g/dL  Hematocrit : 40.9 %  Platelet Count - Automated : 277 K/uL  Mean Cell Volume : 84.7 fL      CBC Full  -  ( 20 Aug 2017 16:25 )  WBC Count : 7.3 K/uL  Hemoglobin : 15.1 g/dL  Hematocrit : 43.0 %  Platelet Count - Automated : 298 K/uL  Mean Cell Volume : 85.5 fL        PT/INR - ( 20 Aug 2017 17:21 )   PT: 11.3 sec;   INR: 1.02          PTT - ( 21 Aug 2017 04:24 )  PTT:116.6 sec  CARDIAC MARKERS ( 20 Aug 2017 16:25 )  x     / <0.01 ng/mL / x     / x     / x          Urinalysis Basic - ( 21 Aug 2017 07:12 )    Color: Yellow / Appearance: Clear / S.010 / pH: x  Gluc: x / Ketone: 40 mg/dL  / Bili: NEGATIVE / Urobili: 0.2 E.U./dL   Blood: x / Protein: NEGATIVE mg/dL / Nitrite: NEGATIVE   Leuk Esterase: NEGATIVE / RBC: x / WBC x   Sq Epi: x / Non Sq Epi: x / Bacteria: x        RADIOLOGY & ADDITIONAL TESTS:  < from: CT Angio Abdomen and Pelvis w/ IV Cont (17 @ 11:56) >    EXAM:  CT ANGIO ABD PELV (W)AW IC                          PROCEDURE DATE:  2017    Quantity of Contrast in Vial in ml: 125 Contrast Used: Optiray 350  Quantity of Contrast Wasted in ml: 5           INTERPRETATION:  CLINICAL INDICATION: 37-year-old with carotid artery   dissection.        FINDINGS: CT angiography of the abdomen and pelvis was performed after   the administration of intravenous contrast. Multiplanar and maximum   intensity projection 3-D reconstructions were performed in the sagittal   and coronal planes. Reference is made to prior CT angiography of the neck   dated 2017.    Evaluation of the lower chest demonstrates normal heart size. Lower lung   parenchyma is unremarkable. Evaluation of the abdomen demonstratesthat   the liver, gallbladder, spleen, pancreas, are unremarkable. There is mild   edematous infiltration surrounding the left adrenal gland and to lesser   extent surrounding the right adrenal gland. Bilateral kidneys are normal   in appearance. Evaluation of the gastrointestinal tract demonstrates no   bowel thickening or bowel obstruction with normal appearance of the   appendix. Post surgical changes of the lower abdominal wall. Evaluation   pelvis demonstrates that the bladder is distended with fluid and rises   into the lower abdomen and consideration to catheterization is   recommended. The prostate gland and seminal vesicles are normal in   appearance. There are small bilateral fat-containing inguinal hernias.   There is no free fluid. No adenopathy. Evaluation of the osseous   structures demonstrates posterior lumbar fusion spanning from L4 to L5   and no destructive osseous lesion.    Evaluation of the vasculature demonstrates minimal vascular   calcification. There is aneurysmal dilatation and complex dissection flap   within the celiac artery measuring maximally 1.9 cm with peripheral   calcification, consistent with chronic nature. Superior mesenteric   artery, inferior mesenteric artery and bilateral renal arteries are   widely patent without stenosis, aneurysm or dissection. Negative for   aortic aneurysm, dissection or stenosis. There is saccular aneurysmal   dilatation of the right common iliac artery measuring 2.2 cm with   peripheral calcification. The left common iliac artery, bilateral   external iliac arteries and bilateral internal iliac arteries are normal   in appearance. Splenic, hepatic and gastroduodenal arteries are normal in   appearance.        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            "Thank you for the opportunity to participate in the care of this   patient."        REFUGIO ORR M.D., ATTENDING RADIOLOGIST  This document has been electronically signed. Aug 21 2017 12:09PM                  < end of copied text >    < from: CT Angio Neck w/ IV Cont (17 @ 17:52) >    EXAM:  CT ANGIO BRAIN (W)AW IC                          EXAM:  CT ANGIO NECK (W)AW IC                          PROCEDURE DATE:  2017     67  Optiray 350   5           INTERPRETATION:  CT ANGIOGRAM OF THE CERVICAL ARTERIES   CTA OF THE [place] [facility] [place]:    INDICATION: Headache after scuba diving 3 weeks prior.    TECHNIQUE:     CTA [place] [facility] [place]:     After the intravenous power injection of non-ionic contrast material,   serial thin sections were obtained through the intracranial circulation   on a multislice CT scanner followed by multiplanar 3-D reformations.   There are no prior studies.    CTA NECK:    After the intravenous power injection of non-ionic contrast material,   serial thin sections were obtained through the cervical circulation on a   multislice CT scanner followed by multiplanar 3-D reformations. There are   no prior studies.    FINDINGS:    CTA [place] [facility] [place]:    Posterior circulation demonstrates a hypoplastic right vertebral artery   that predominantly endsin PICA, normal variant. The distal left   vertebral artery and basilar artery are unremarkable. There is slight   ectasia of the basilar artery. Patient demonstrates bilateral fetal   origin PCAs with hypoplastic P1 segments bilaterally, normal variant. No   significant stenosis is seen in the intracranial posterior circulation.    No stenosis is seen in the MELCHOR bilaterally or MCA bilaterally. The   anterior communicating artery is hypoplastic, normal variant. No stenosis   is seen in the cavernous or supraclinoid ICA bilaterally..    CTA NECK:    Origins of the left subclavian artery, left common carotid artery, right   innominate artery, right subclavian artery, right common carotid artery   are normal. Left vertebral artery origin is patent. Left vertebral artery   is dominant. The right vertebral artery origin is patent. The right   vertebral artery is hypoplastic and predominantly ends in PICA, normal   variant.     At the carotid bifurcation, there is no significant stenosis.    There is marked abnormality of the left internal carotid artery in its   cervical portion, in which there is an abrupt change in caliber at   approximately the C3 level with a suggestion of an intimal flap   consistent with ICA dissection, and there is an area of flow-related   enhancement projecting lateral to the lumen consistent with   pseudoaneurysm. Beyond this area of abnormality, the caliber to the upper   cervical left ICA to the proximal petrous left ICA is thinner in caliber   but there isgood flow seen in the upper cervical left ICA and into the   left petrous ICA where the vessel assumes a more normal caliber in the   mid and distal left petrous ICA. Proximal to this dissection, the lumen   has a slightly nodular appearance suggestive of fibromuscular dysplasia   or mixed connective tissue disorder, which may be an etiology for the   dissection.    There is marked abnormality of the right internal carotid artery just   past its origin in which the lumen has a nodular appearance,which   suggests fibromuscular dysplasia or mixed connective tissue disorder. In   addition, in the upper right cervical ICA there is  an area of linear low   attenuation within the lumen at the C1-C2 level consistent with an   intimal flap of dissection. Medially, there is focal area of flow-related   enhancement suggestive of pseudoaneurysm. Beyond this there is thinner   caliber to the uppermost cervical right ICA extending into the proximal   right petrous ICA segment, which is not flow-limiting, and there is a   more normal caliber at to the mid and distal right petrous ICA.    There is multilevel degenerative disc disease in which findings loss of   disc space height and endplate sclerosis particularly at C5-C6 and C6-C7   levels. Thereappears be a left paracentral hypertrophic foraminal disc   protrusion at the C6-C7 level and a central disc protrusion at the C5-C6   level. There is multilevel degenerative osteoarthritis. Findings include   endplate osteophytosis and uncovertebralspurring. This results in   multilevel foraminal narrowing particularly at the C3-C4 level   bilaterally; left-sided at the C4-C5 level; bilaterally at the C5-C6   level; and left-sided at the C6-C7 level.    IMPRESSION:       CTA COW:      1. No intracranial stenosis of the [place] [facility] [place].  2. Normal variants of anatomy including bilateral fetal origin PCAs and   hypoplastic right vertebral artery predominantly ending in PICA.    CTA NECK:    1. Bilateral ICA dissections with pseudoaneurysm formation bilaterally   and areas of concentric narrowing of the upper most cervical ICA   bilaterally, left more than right, extending into the proximal petrous   ICA bilaterally but there is good flow past the area of dissections   bilaterally and a more normal caliber to the mid and distal petrous ICA   bilaterally.  2. Nodularity to the ICA lumen bilaterally proximal to the dissection   suggest underlying mixed connective tissue disorder such as fibromuscular   dysplasia, which may be the underlying etiology for the bilateral   dissections.   3. Multilevel degenerative disc disease and osteoarthritis, particularly   at the C5-C6 and C6-C7 levels.  4. Hypoplastic right vertebral artery predominantly ends in PICA, normal   variant.    Results were discussed with Dr. Miller at the time of dictation.            "Thank you for the opportunity to participate in the care of this   patient."        SONYA NAVA M.D., ATTENDING RADIOLOGIST  This document has been electronically signed. Aug 20 2017  6:26PM                  < end of copied text >  < from: CT Angio Head w/ IV Cont (17 @ 17:52) >    EXAM:  CT ANGIO BRAIN (W)AW IC                          EXAM:  CT ANGIO NECK (W)AW IC                          PROCEDURE DATE:  2017     67  Optiray 350   5             EKG/Telemetry: Reviewed

## 2017-08-21 NOTE — DISCHARGE NOTE ADULT - CARE PLAN
Principal Discharge DX:	Carotid artery dissection  Goal:	Stabilize and heal dissected arteries. Principal Discharge DX:	Carotid artery dissection  Goal:	Stabilize and heal dissected arteries.  Instructions for follow-up, activity and diet:	Activity: No strenuous activity. Avoid neck manipulation with massage or chiropractor. Diet: Regular.  New medication: Xarelto 15mg 2x/day for 3 weeks, then start 20mg daily. Prescriptions sent to your pharmacy. No refills. See Dr Pinto for refills. Follow up Dr Pinto in 1 - 2 weeks after discharge. Office: [phone].  Follow up with rheumatologist Dr Lea Landeros: She will have all your lab tests. Follow up with your ENT Dr Luis Armando Arnodl. Principal Discharge DX:	Carotid artery dissection  Goal:	Stabilize and heal dissected arteries.  Instructions for follow-up, activity and diet:	Activity: No strenuous activity. Avoid neck manipulation with massage or chiropractor. Diet: Regular.  New medication: Xarelto 15mg 2x/day for 3 weeks, then start 20mg daily. Prescriptions sent to your pharmacy. No refills. See Dr Pinto for refills. Follow up Dr Pinto in 1 - 2 weeks after discharge. Office: [phone].  Follow up with rheumatologist Dr Lea Landeros: She will have all your lab tests. Follow up with your ENT Dr Luis Armando Arnold. Principal Discharge DX:	Carotid artery dissection  Goal:	Stabilize and heal dissected arteries.  Instructions for follow-up, activity and diet:	Activity: No strenuous activity. Avoid neck manipulation with massage or chiropractor. Diet: Regular.  New medication: Xarelto 15mg 2x/day for 3 weeks, then start 20mg daily. Prescriptions sent to your pharmacy. No refills. See Dr Pinto for refills. Follow up Dr Pinto in 1 - 2 weeks after discharge. Office: [phone]. Return to hospital if any neurological symptoms develop.    Follow up with rheumatologist Dr Lea Landeros: She will have all your lab tests. Follow up with your ENT Dr Luis Armando Arnold. Principal Discharge DX:	Carotid artery dissection  Goal:	Stabilize and heal dissected arteries.  Instructions for follow-up, activity and diet:	Activity: No strenuous activity. Avoid neck manipulation with massage or chiropractor. Diet: Regular.  New medication: Xarelto 15mg 2x/day for 3 weeks, then start 20mg daily. Prescriptions sent to your pharmacy. No refills. See Dr Pinto for refills. Follow up Dr Pinto in 1 - 2 weeks after discharge. Office: [phone]. Return to hospital if any neurological symptoms develop.    Follow up with rheumatologist Dr Lea Landeros: She will have all your lab tests. Follow up with your ENT Dr Luis Armando Arnold.

## 2017-08-21 NOTE — DISCHARGE NOTE ADULT - CARE PROVIDERS DIRECT ADDRESSES
[email or web address]_Unknown,DirectAddress_Unknown

## 2017-08-21 NOTE — CONSULT NOTE ADULT - SUBJECTIVE AND OBJECTIVE BOX
Stroke Consult Note    HPI: Juan A Haddad is a 57 year old man, former smoker (7.5 pack-year history), who presented to the [facility] ED on 17 with a 2.5 week history of headache. The patient reported that he was on vacation in [place] where he completed "2 shallow dives" while SCUBA diving. He said that when he came up from the dives, he had a "bad" (8/10) headache, high blood pressure, nausea and gait instability. He has been SCUBA diving before without incident. The headache is nonspecific, at times in the right frontal area, or left posterior area, made worse with movement and alleviated by Excedrin. Since the second, the patient reported having a slight headache (2/10) most of the say, and "one big one" daily that he describes as a throbbing sensation with a pain of 8/10, starting in the late afternoon and resolving with 3-4 tabs of Excedrin. He reports that his neck was sore following the dive, but denies any injury or harsh movements that would have caused pain.     The patient came home to [place] on the  (Saturday) and saw his GP on the  (Thursday). The GP sent him to ENT, who he saw on . The ENT sent him to a hyperbaric chamber that day, and he received six hours of therapy. The patient reported that following the hyperbaric chamber his nausea and gait instability resolved, but his headache remained. The pt reported calling his ENT on  () and explaining that his headache had not resolved. The ENT advised him to go to the [facility] ED, where the patient had CTA showing b/l dissection of the carotid arteries. Pt denies ever having headaches like this in the past. Pt denies weakness or visual changes, tingling, numbness, or memory loss, and he denies any blood in his urine or stool.         PAST MEDICAL & SURGICAL HISTORY:  HTN (hypertension)  Chronic fatigue syndrome, well controlled  Depression  History of back surgery: lumbar spine  H/O hernia repair      MEDICATIONS  (STANDING):  lisinopril 40 milliGRAM(s) Oral daily  venlafaxine XR. 300 milliGRAM(s) Oral daily  insulin lispro (HumaLOG) corrective regimen sliding scale   SubCutaneous Before meals and at bedtime  levothyroxine 12.5 MICROGram(s) Oral daily  aspirin  chewable 81 milliGRAM(s) Oral daily  mirtazapine 60 milliGRAM(s) Oral at bedtime  tamsulosin 0.4 milliGRAM(s) Oral at bedtime  heparin  Infusion 1300 Unit(s)/Hr (13 mL/Hr) IV Continuous <Continuous>    MEDICATIONS  (PRN):  labetalol Injectable 10 milliGRAM(s) IV Push every 6 hours PRN sbp> 140  hydrALAZINE Injectable 10 milliGRAM(s) IV Push every 6 hours PRN sbp> 140  ketorolac   Injectable 30 milliGRAM(s) IV Push every 6 hours PRN Severe Pain (7 - 10)      Allergies  No Known Allergies    FAMILY HISTORY:  dad had MI in 40's    Social History:  former smoker    REVIEW OF SYSTEMS:  As per HPI, otherwise negative for Constitutional, Eyes, Ears/Nose/Mouth/Throat, Neck, Cardiovascular, Respiratory, Gastrointestinal, Genitourinary, Skin, Endocrine, Musculoskeletal, Psychiatric, and Hematologic/Lymphatic.    Vital Signs Last 24 Hrs  T(C): 36.4 (21 Aug 2017 14:50), Max: 36.5 (20 Aug 2017 20:06)  T(F): 97.6 (21 Aug 2017 14:50), Max: 97.7 (20 Aug 2017 20:06)  HR: 74 (21 Aug 2017 17:00) (64 - 102)  BP: 140/87 (21 Aug 2017 17:00) (122/70 - 155/97)  BP(mean): 104 (21 Aug 2017 17:00) (90 - 121)  RR: 13 (21 Aug 2017 17:00) (11 - 42)  SpO2: 98% (21 Aug 2017 17:00) (96% - 99%)    CAPILLARY BLOOD GLUCOSE  110 (21 Aug 2017 17:00)  92 (21 Aug 2017 12:00)  117 (21 Aug 2017 06:00)    Physical exam:  Gen: sitting in bed, no acute distress, well-nourished  Carotid arteries- no bruits,   CV: reg rate and rhythm  Extremities: 2+ radial pulses bilaterally    Neurological examination:  Mental status: alert and oriented to person, place, day, date, time. He has a good fund of knowledge. Repeat and recall after 5 minutes 3/3. Able to spell world forward and backwards. Able to name the days of the week backwards. Naming intact 3/3 high and low frequency words. Language fluent.    Cranial nerves: pupils equally round and reactive to light, visual fields full, no nystagmus, extraocular muscles intact, V1 through V3 intact bilaterally and symmetric, face symmetric, hearing intact to finger rub, palate elevation symmetric, tongue was midline, sternocleidomastoid/shoulder shrug strength bilaterally 5/5.    Motor:  Normal bulk and tone, no pronator drift, strength 5/5 in bilateral upper and lower extremities.  Sensation: Intact to light touch, vibration, temperature, pinprick.  No neglect.   Coordination: No dysmetria on finger-to-nose bilaterally  Reflexes: 2+ in upper and lower extremities, absent Babinski bilaterally  Gait: deferred    NIHSS: 0.  No tpa secondary to no definable neurological deficit, outside window    Labs:                        14.5   9.4   )-----------( 283      ( 21 Aug 2017 04:24 )             41.1     08-21    129<L>  |  93<L>  |  13  ----------------------------<  120<H>  3.8   |  22  |  0.70    Ca    9.1      21 Aug 2017 04:24  Phos  3.1     -  Mg     2.0     -    TPro  7.8  /  Alb  4.2  /  TBili  0.2  /  DBili  x   /  AST  27  /  ALT  35  /  AlkPhos  71  08-20    PT/INR - ( 20 Aug 2017 17:21 )   PT: 11.3 sec;   INR: 1.02     PTT - ( 21 Aug 2017 12:49 )  PTT:59.3 sec  Urinalysis Basic - ( 21 Aug 2017 07:12 )    Color: Yellow / Appearance: Clear / S.010 / pH: x  Gluc: x / Ketone: 40 mg/dL  / Bili: NEGATIVE / Urobili: 0.2 E.U./dL   Blood: x / Protein: NEGATIVE mg/dL / Nitrite: NEGATIVE   Leuk Esterase: NEGATIVE / RBC: x / WBC x   Sq Epi: x / Non Sq Epi: x / Bacteria: x    Radiology and Additional Studies:  CT Angio Abdomen and pelvis with IV contrast (17):  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    CT Head No Cont (17 @ 17:51) - No CT evidence of acute intracranial hemorrhage and no apparent acute   abnormality.    CT Angio Neck with IV Contrast 17:     CTA COW:    1. No intracranial stenosis of the [place].  2. 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    CTA NECK:  1. 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  2. Nodularity to the ICA lumen bilaterally proximal to the dissection suggest underlying mixed connective tissue disorder such as fibromuscular dysplasia, which may be the underlying etiology for the bilateral dissections.   3. Multilevel degenerative disc disease and osteoarthritis, particularly at the C5-C6 and C6-C7 levels.  4. 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      Assessment: 57 year-old male presents with headache since scuba diving and found to have multiple vessel dissections.  His neurological exam is intact.    Plan:  1) Frequent neurological checks to rule out new infarcts  2) Continue Heparin gtt for anticoagulation given multiple vessel dissections.  PTT 50-80.  Why is he also on Aspirin?  3) Consider hypercoagulable workup vasculitis workup to rule out underlying etiology for vessel compromise including connective tissue disease.  Agree with Rheumatology consult  4) MRI Brain without ranjeet to rule out infarct since can influence choice of treatment.  5) Blood pressure control - avoid hypotension and hypertension, sbp 120 - 160   6) DVT Prophylaxis  7) PT/OT eval

## 2017-08-21 NOTE — MEDICAL STUDENT ADULT H&P (EDUCATION) - NS MD HP STUD HX OF PRESENT ILLNESS FT
Juan A Haddad is a 57 year old man, former 7.5 pack-year history smoker, who presented to the [facility] ED on 8/20/17 with a 3 week history of headache. The patient reported that he was on vacation in [place] where he completed "2 shallow dives" while SCUBA diving. He said that when he came up from the dives, he had a "bad" (8/10) headache, high blood pressure, nausea and gait instability. Since then, the headache has come on daily as a throbbing sensation with a pain of 8/10, starting in the late afternoon and resolving with 3-4 tabs of Excedrin The patient came home on the 5th of August (Saturday) and saw his GP on the 10th (Thursday). The GP sent him to ENT, who he saw on Monday 8/14. The ENT sent him to a hyperbaric chamber that day, and he received six hours of therapy. The patient reported that following the hyperbaric chamber his nausea and gait instability resolved, but his headache remained. The pt reported calling his ENT on Sunday (8/20) and explaining that his headache had not resolved. The ENT advised him to go to the [facility] ED, where the patient had CTA showing b/l dissection of the carotid arteries. Pt denies weakness or visual changes. He denies any tingling, numbness, or memory loss. Juan A Haddad is a 57 year old man, former 7.5 pack-year history smoker, who presented to the [facility] ED on 8/20/17 with a 2.5 week history of headache. The patient reported that he was on vacation in [place] where he completed "2 shallow dives" while SCUBA diving. He said that when he came up from the dives, he had a "bad" (8/10) headache, high blood pressure, nausea and gait instability. Since then, the headache has come on daily as a throbbing sensation with a pain of 8/10, starting in the late afternoon and resolving with 3-4 tabs of Excedrin The patient came home on the 5th of August (Saturday) and saw his GP on the 10th (Thursday). The GP sent him to ENT, who he saw on Monday 8/14. The ENT sent him to a hyperbaric chamber that day, and he received six hours of therapy. The patient reported that following the hyperbaric chamber his nausea and gait instability resolved, but his headache remained. The pt reported calling his ENT on Sunday (8/20) and explaining that his headache had not resolved. The ENT advised him to go to the [facility] ED, where the patient had CTA showing b/l dissection of the carotid arteries. Pt denies weakness or visual changes. He denies any tingling, numbness, or memory loss. Juan A Haddad is a 57 year old man, former smoker (7.5 pack-year history), who presented to the [facility] ED on 8/20/17 with a 2.5 week history of headache. The patient reported that he was on vacation in [place] where he completed "2 shallow dives" while SCUBA diving. He said that when he came up from the dives, he had a "bad" (8/10) headache, high blood pressure, nausea and gait instability. He has been SCUBA diving before without incident. The headache is nonspecific, at times in the right frontal area, or left posterior area, made worse with movement and alleviated by Excedrin. Since the second, the patient reported having a slight headache (2/10) most of the say, and "one big one" daily that he describes as a throbbing sensation with a pain of 8/10, starting in the late afternoon and resolving with 3-4 tabs of Excedrin. He reports that his neck was sore following the dive, but denies any injury or harsh movements that would have caused pain. The patient came home to [place] on the 5th of August (Saturday) and saw his GP on the 10th (Thursday). The GP sent him to ENT, who he saw on Monday 8/14. The ENT sent him to a hyperbaric chamber that day, and he received six hours of therapy. The patient reported that following the hyperbaric chamber his nausea and gait instability resolved, but his headache remained. The pt reported calling his ENT on Sunday (8/20) and explaining that his headache had not resolved. The ENT advised him to go to the [facility] ED, where the patient had CTA showing b/l dissection of the carotid arteries. Pt denies ever having headaches like this in the past. Pt denies weakness or visual changes, tingling, numbness, or memory loss, and he denies any blood in his urine or stool. Juan A Haddad is a 57 year old  man, former smoker (7.5 pack-year history), who presented to the [facility] ED on 8/20/17 with a 2.5 week history of headache. The patient reported that he was on vacation in [place] where he completed "2 shallow dives" while SCUBA diving. He said that when he came up from the dives, he had a "bad" (8/10) headache, high blood pressure, nausea and gait instability. He has been SCUBA diving before without incident. The headache is nonspecific, at times in the right frontal area, or left posterior area, made worse with movement and alleviated by Excedrin. Since the second, the patient reported having a slight headache (2/10) most of the say, and "one big one" daily that he describes as a throbbing sensation with a pain of 8/10, starting in the late afternoon and resolving with 3-4 tabs of Excedrin. He reports that his neck was sore following the dive, but denies any injury or harsh movements that would have caused pain. The patient came home to [place] on the 5th of August (Saturday) and saw his GP on the 10th (Thursday). The GP sent him to ENT, who he saw on Monday 8/14. The ENT sent him to a hyperbaric chamber that day, and he received six hours of therapy. The patient reported that following the hyperbaric chamber his nausea and gait instability resolved, but his headache remained. The pt reported calling his ENT on Sunday (8/20) and explaining that his headache had not resolved. The ENT advised him to go to the [facility] ED, where the patient had CTA showing b/l dissection of the carotid arteries. Pt denies ever having headaches like this in the past. Pt denies weakness or visual changes, tingling, numbness, or memory loss, and he denies any blood in his urine or stool.

## 2017-08-21 NOTE — DISCHARGE NOTE ADULT - HOSPITAL COURSE
Pt is a 58yo M PMHx chronic fatigue syndrome, depression, HTN who presents to [facility] ED with headache since 8/2/2017. Pt  went scuba diving 8/2 and afterwards developed headache, nausea, unstable gait and inability to concentrate. He saw his PCP who referred him to ENT specialist. ENT physician recommended hyperbaric chamber which Pt received 6hrs of treatment on 8/14/16. Afterwards unstable gait and nausea resolved but headache persisted. Pt denies blurry vision, dizziness, weakness, numbness, tingling, memory lost, change in speech, confusion, slurred speech, facial/eye drooping, CP/SOB.  ENT physician advised Pt to come to [facility] ED further evaluation. At the ED CTA head/neck was performed which revealed b/l carotid dissection. Pt admitted to SICU for BP control and heparin infusion. Pt is a 58yo M PMHx chronic fatigue syndrome, depression, HTN who presents to [facility] ED with headache since 8/2/2017. Pt  went scuba diving 8/2 and afterwards developed headache, nausea, unstable gait and inability to concentrate. He saw his PCP who referred him to ENT specialist. ENT physician recommended hyperbaric chamber which Pt received 6hrs of treatment on 8/14/16. Afterwards unstable gait and nausea resolved but headache persisted. Pt denies blurry vision, dizziness, weakness, numbness, tingling, memory lost, change in speech, confusion, slurred speech, facial/eye drooping, CP/SOB.  ENT physician advised Pt to come to [facility] ED further evaluation. At the ED CTA head/neck was performed which revealed b/l carotid dissection. Pt admitted to SICU for BP control, heparin infusion and neuro checks. Neuro consult obtained. No neuro deficits. MRI showed no infarcts. Rheumatology consult began work up for collagen  vascular disorders.  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 Lillie Pinto and Marilu reviewed CT scans and they do not think this is FMD. Pt stepped down from SICU. Heparin stopped and xarelto started. Carotid duplex showed no significant stenosis b/l and dissections were not seen at that level. Pt discharged home on xarelto 15mg BID and ASA 81mg. He will follow  up with Leti Nava and Catalina.

## 2017-08-21 NOTE — CONSULT NOTE ADULT - PROBLEM SELECTOR RECOMMENDATION 3
BP controlled at present on no medications at home.  Vasodilator medications with hydralazine as ordered HTN on lisinopril, labetalol and hydralazine IV for now.  BP controlled at present  Continue antihypertensive medications

## 2017-08-21 NOTE — CONSULT NOTE ADULT - ASSESSMENT
57 year male with PMHx of HLD, Depression, GERD, BPH with recent history of travel to [facility] with scuba diving now admitted with bilateral carotid dissection. Nephrology consulted to evaluate for possible fibromuscular dysplasia of renal vessel as well as hyponatremia. 57 year male with PMHx of HLD, Depression, GERD, BPH with recent history of travel to [facility] with scuba diving now admitted with bilateral carotid dissection. Nephrology consulted to evaluate for possible fibromuscular dysplasia of renal vessel as well as hyponatremia. Na level 129 at present

## 2017-08-21 NOTE — PROGRESS NOTE ADULT - SUBJECTIVE AND OBJECTIVE BOX
pt with Headache after scuba diving, workup eventually revealed b/l carotid dissection on head CT. pt denies weakness, denies visual disturbances.     S: No new issues/events overnight, no new med c/o    O: ICU Vital Signs Last 24 Hrs  T(F): 97.1 (17 @ 06:33), Max: 98.6 (17 @ 15:01)  HR: 82 (17 @ 08:00) (64 - 102)  BP: 128/81 (17 @ 08:00) (128/81 - 155/97)  BP(mean): 96 (17 @ 08:00) (93 - 121)  ABP: --  RR: 12 (17 @ 08:00) (11 - 42)  SpO2: 98% (17 @ 08:00) (96% - 98%)    PHYSICAL EXAM:     Neurological: AAOx3, CNII-XII intact,  strength 5/5 b/l, no nystagmus, no pronator drift. PERRL,   Cardiovascular: RRR  Respiratory: CTA  Gastrointestinal: soft, NT, ND, BS+  Extremities: warm, no dependent edema  Vascular: no cyanosis/erythema    LABS:        129<L>  |  93<L>  |  13  ----------------------------<  120<H>  3.8   |  22  |  0.70    Ca    9.1      21 Aug 2017 04:24  Phos  3.1       Mg     2.0         TPro  7.8  /  Alb  4.2  /  TBili  0.2  /  DBili  x   /  AST  27  /  ALT  35  /  AlkPhos  71  08-20  LIVER FUNCTIONS - ( 20 Aug 2017 16:25 )  Alb: 4.2 g/dL / Pro: 7.8 g/dL / ALK PHOS: 71 U/L / ALT: 35 U/L / AST: 27 U/L / GGT: x                               14.5   9.4   )-----------( 283      ( 21 Aug 2017 04:24 )             41.1   PT/INR - ( 20 Aug 2017 17:21 )   PT: 11.3 sec;   INR: 1.02          PTT - ( 21 Aug 2017 04:24 )  PTT:116.6 secCARDIAC MARKERS ( 20 Aug 2017 16:25 )  x     / <0.01 ng/mL / x     / x     / x        Urinalysis Basic - ( 21 Aug 2017 07:12 )    Color: Yellow / Appearance: Clear / S.010 / pH: x  Gluc: x / Ketone: 40 mg/dL  / Bili: NEGATIVE / Urobili: 0.2 E.U./dL   Blood: x / Protein: NEGATIVE mg/dL / Nitrite: NEGATIVE   Leuk Esterase: NEGATIVE / RBC: x / WBC x   Sq Epi: x / Non Sq Epi: x / Bacteria: x    CAPILLARY BLOOD GLUCOSE        MEDICATIONS  (STANDING):  lisinopril 40 milliGRAM(s) Oral daily  venlafaxine XR. 300 milliGRAM(s) Oral daily  insulin lispro (HumaLOG) corrective regimen sliding scale   SubCutaneous Before meals and at bedtime  levothyroxine 12.5 MICROGram(s) Oral daily  aspirin  chewable 81 milliGRAM(s) Oral daily  mirtazapine 60 milliGRAM(s) Oral at bedtime  tamsulosin 0.4 milliGRAM(s) Oral at bedtime  heparin  Infusion 1300 Unit(s)/Hr (13 mL/Hr) IV Continuous <Continuous>  potassium chloride    Tablet ER 20 milliEquivalent(s) Oral once    MEDICATIONS  (PRN):  labetalol Injectable 10 milliGRAM(s) IV Push every 6 hours PRN sbp> 140  dextrose Gel 1 Dose(s) Oral once PRN Blood Glucose LESS THAN 70 milliGRAM(s)/deciliter  glucagon  Injectable 1 milliGRAM(s) IntraMuscular once PRN Glucose LESS THAN 70 milligrams/deciliter  hydrALAZINE Injectable 10 milliGRAM(s) IV Push every 6 hours PRN sbp> 140  ketorolac   Injectable 30 milliGRAM(s) IV Push every 6 hours PRN Severe Pain (7 - 10)      Rebolledo:	  [ ] None	[x ] Daily Rebolledo Order Placed	   Indication:	  [ x] Strict I and O's    [ ] Obstruction     [ ] Incontinence + Stage 3 or 4 Decubitus  Central Line:  [ x] None	   [ ]  Medication / TPN Administration     [ ] No Peripheral IV

## 2017-08-22 VITALS
HEART RATE: 90 BPM | SYSTOLIC BLOOD PRESSURE: 139 MMHG | OXYGEN SATURATION: 96 % | RESPIRATION RATE: 16 BRPM | DIASTOLIC BLOOD PRESSURE: 76 MMHG

## 2017-08-22 LAB
ANION GAP SERPL CALC-SCNC: 10 MMOL/L — SIGNIFICANT CHANGE UP (ref 5–17)
APPEARANCE UR: CLEAR — SIGNIFICANT CHANGE UP
APTT BLD: 100.4 SEC — HIGH (ref 27.5–37.4)
APTT BLD: >200 SEC — CRITICAL HIGH (ref 27.5–37.4)
BILIRUB UR-MCNC: NEGATIVE — SIGNIFICANT CHANGE UP
BUN SERPL-MCNC: 13 MG/DL — SIGNIFICANT CHANGE UP (ref 7–23)
CALCIUM SERPL-MCNC: 9.3 MG/DL — SIGNIFICANT CHANGE UP (ref 8.4–10.5)
CHLORIDE SERPL-SCNC: 96 MMOL/L — SIGNIFICANT CHANGE UP (ref 96–108)
CO2 SERPL-SCNC: 25 MMOL/L — SIGNIFICANT CHANGE UP (ref 22–31)
COLOR SPEC: YELLOW — SIGNIFICANT CHANGE UP
CREAT SERPL-MCNC: 0.7 MG/DL — SIGNIFICANT CHANGE UP (ref 0.5–1.3)
CRP SERPL-MCNC: 0.1 MG/DL — SIGNIFICANT CHANGE UP (ref 0–0.4)
DIFF PNL FLD: NEGATIVE — SIGNIFICANT CHANGE UP
DSDNA AB FLD-ACNC: <0.2 AI — SIGNIFICANT CHANGE UP
ENA SS-A AB FLD IA-ACNC: <0.2 AI — SIGNIFICANT CHANGE UP
ERYTHROCYTE [SEDIMENTATION RATE] IN BLOOD: 10 MM/HR — SIGNIFICANT CHANGE UP
FERRITIN SERPL-MCNC: 193.8 NG/ML — SIGNIFICANT CHANGE UP (ref 30–400)
GLUCOSE SERPL-MCNC: 100 MG/DL — HIGH (ref 70–99)
GLUCOSE UR QL: NEGATIVE — SIGNIFICANT CHANGE UP
HCT VFR BLD CALC: 41.2 % — SIGNIFICANT CHANGE UP (ref 39–50)
HGB BLD-MCNC: 14.2 G/DL — SIGNIFICANT CHANGE UP (ref 13–17)
INR BLD: 1.04 — SIGNIFICANT CHANGE UP (ref 0.88–1.16)
KETONES UR-MCNC: NEGATIVE — SIGNIFICANT CHANGE UP
LEUKOCYTE ESTERASE UR-ACNC: NEGATIVE — SIGNIFICANT CHANGE UP
MAGNESIUM SERPL-MCNC: 2.1 MG/DL — SIGNIFICANT CHANGE UP (ref 1.6–2.6)
MCHC RBC-ENTMCNC: 29.2 PG — SIGNIFICANT CHANGE UP (ref 27–34)
MCHC RBC-ENTMCNC: 34.5 G/DL — SIGNIFICANT CHANGE UP (ref 32–36)
MCV RBC AUTO: 84.8 FL — SIGNIFICANT CHANGE UP (ref 80–100)
NITRITE UR-MCNC: NEGATIVE — SIGNIFICANT CHANGE UP
OSMOLALITY UR: 243 MOSMOL/KG — SIGNIFICANT CHANGE UP (ref 100–650)
PH UR: 7 — SIGNIFICANT CHANGE UP (ref 5–8)
PHOSPHATE SERPL-MCNC: 3.2 MG/DL — SIGNIFICANT CHANGE UP (ref 2.5–4.5)
PLATELET # BLD AUTO: 313 K/UL — SIGNIFICANT CHANGE UP (ref 150–400)
POTASSIUM SERPL-MCNC: 4.3 MMOL/L — SIGNIFICANT CHANGE UP (ref 3.5–5.3)
POTASSIUM SERPL-SCNC: 4.3 MMOL/L — SIGNIFICANT CHANGE UP (ref 3.5–5.3)
PROT SERPL-MCNC: 6.2 G/DL — SIGNIFICANT CHANGE UP (ref 6–8.3)
PROT SERPL-MCNC: 6.2 G/DL — SIGNIFICANT CHANGE UP (ref 6–8.3)
PROT UR-MCNC: NEGATIVE MG/DL — SIGNIFICANT CHANGE UP
PROTHROM AB SERPL-ACNC: 11.6 SEC — SIGNIFICANT CHANGE UP (ref 9.8–12.7)
RBC # BLD: 4.86 M/UL — SIGNIFICANT CHANGE UP (ref 4.2–5.8)
RBC # FLD: 12.5 % — SIGNIFICANT CHANGE UP (ref 10.3–16.9)
RHEUMATOID FACT SERPL-ACNC: 12 IU/ML — SIGNIFICANT CHANGE UP (ref 0–13.9)
SODIUM SERPL-SCNC: 131 MMOL/L — LOW (ref 135–145)
SODIUM UR-SCNC: 20 MMOL/L — SIGNIFICANT CHANGE UP
SP GR SPEC: <=1.005 — SIGNIFICANT CHANGE UP (ref 1–1.03)
UROBILINOGEN FLD QL: 0.2 E.U./DL — SIGNIFICANT CHANGE UP
WBC # BLD: 7.6 K/UL — SIGNIFICANT CHANGE UP (ref 3.8–10.5)
WBC # FLD AUTO: 7.6 K/UL — SIGNIFICANT CHANGE UP (ref 3.8–10.5)

## 2017-08-22 PROCEDURE — 96375 TX/PRO/DX INJ NEW DRUG ADDON: CPT | Mod: XU

## 2017-08-22 PROCEDURE — 74174 CTA ABD&PLVS W/CONTRAST: CPT

## 2017-08-22 PROCEDURE — 93880 EXTRACRANIAL BILAT STUDY: CPT | Mod: 26

## 2017-08-22 PROCEDURE — 70496 CT ANGIOGRAPHY HEAD: CPT

## 2017-08-22 PROCEDURE — 86147 CARDIOLIPIN ANTIBODY EA IG: CPT

## 2017-08-22 PROCEDURE — 86146 BETA-2 GLYCOPROTEIN ANTIBODY: CPT

## 2017-08-22 PROCEDURE — 83735 ASSAY OF MAGNESIUM: CPT

## 2017-08-22 PROCEDURE — 85379 FIBRIN DEGRADATION QUANT: CPT

## 2017-08-22 PROCEDURE — 84100 ASSAY OF PHOSPHORUS: CPT

## 2017-08-22 PROCEDURE — 85652 RBC SED RATE AUTOMATED: CPT

## 2017-08-22 PROCEDURE — 85027 COMPLETE CBC AUTOMATED: CPT

## 2017-08-22 PROCEDURE — 99232 SBSQ HOSP IP/OBS MODERATE 35: CPT | Mod: GC

## 2017-08-22 PROCEDURE — 80048 BASIC METABOLIC PNL TOTAL CA: CPT

## 2017-08-22 PROCEDURE — 85025 COMPLETE CBC W/AUTO DIFF WBC: CPT

## 2017-08-22 PROCEDURE — 82728 ASSAY OF FERRITIN: CPT

## 2017-08-22 PROCEDURE — 86235 NUCLEAR ANTIGEN ANTIBODY: CPT

## 2017-08-22 PROCEDURE — 36415 COLL VENOUS BLD VENIPUNCTURE: CPT

## 2017-08-22 PROCEDURE — 83935 ASSAY OF URINE OSMOLALITY: CPT

## 2017-08-22 PROCEDURE — 93005 ELECTROCARDIOGRAM TRACING: CPT

## 2017-08-22 PROCEDURE — 80053 COMPREHEN METABOLIC PANEL: CPT

## 2017-08-22 PROCEDURE — 86038 ANTINUCLEAR ANTIBODIES: CPT

## 2017-08-22 PROCEDURE — 85300 ANTITHROMBIN III ACTIVITY: CPT

## 2017-08-22 PROCEDURE — 83930 ASSAY OF BLOOD OSMOLALITY: CPT

## 2017-08-22 PROCEDURE — 99285 EMERGENCY DEPT VISIT HI MDM: CPT | Mod: 25

## 2017-08-22 PROCEDURE — 84300 ASSAY OF URINE SODIUM: CPT

## 2017-08-22 PROCEDURE — 70498 CT ANGIOGRAPHY NECK: CPT

## 2017-08-22 PROCEDURE — 70450 CT HEAD/BRAIN W/O DYE: CPT

## 2017-08-22 PROCEDURE — 86140 C-REACTIVE PROTEIN: CPT

## 2017-08-22 PROCEDURE — 81003 URINALYSIS AUTO W/O SCOPE: CPT

## 2017-08-22 PROCEDURE — 85610 PROTHROMBIN TIME: CPT

## 2017-08-22 PROCEDURE — 85306 CLOT INHIBIT PROT S FREE: CPT

## 2017-08-22 PROCEDURE — 97161 PT EVAL LOW COMPLEX 20 MIN: CPT

## 2017-08-22 PROCEDURE — 83090 ASSAY OF HOMOCYSTEINE: CPT

## 2017-08-22 PROCEDURE — 99233 SBSQ HOSP IP/OBS HIGH 50: CPT

## 2017-08-22 PROCEDURE — 93880 EXTRACRANIAL BILAT STUDY: CPT

## 2017-08-22 PROCEDURE — 85730 THROMBOPLASTIN TIME PARTIAL: CPT

## 2017-08-22 PROCEDURE — 96374 THER/PROPH/DIAG INJ IV PUSH: CPT | Mod: XU

## 2017-08-22 PROCEDURE — 85303 CLOT INHIBIT PROT C ACTIVITY: CPT

## 2017-08-22 PROCEDURE — 84484 ASSAY OF TROPONIN QUANT: CPT

## 2017-08-22 PROCEDURE — 86431 RHEUMATOID FACTOR QUANT: CPT

## 2017-08-22 PROCEDURE — 84165 PROTEIN E-PHORESIS SERUM: CPT

## 2017-08-22 PROCEDURE — 85598 HEXAGNAL PHOSPH PLTLT NEUTRL: CPT

## 2017-08-22 PROCEDURE — 70551 MRI BRAIN STEM W/O DYE: CPT

## 2017-08-22 RX ORDER — RIVAROXABAN 15 MG-20MG
15 KIT ORAL ONCE
Qty: 0 | Refills: 0 | Status: COMPLETED | OUTPATIENT
Start: 2017-08-22 | End: 2017-08-22

## 2017-08-22 RX ORDER — RIVAROXABAN 15 MG-20MG
1 KIT ORAL
Qty: 0 | Refills: 0 | COMMUNITY
Start: 2017-08-22

## 2017-08-22 RX ORDER — LIOTHYRONINE SODIUM 25 UG/1
12.5 TABLET ORAL DAILY
Qty: 0 | Refills: 0 | Status: DISCONTINUED | OUTPATIENT
Start: 2017-08-22 | End: 2017-08-22

## 2017-08-22 RX ORDER — HEPARIN SODIUM 5000 [USP'U]/ML
1300 INJECTION INTRAVENOUS; SUBCUTANEOUS
Qty: 25000 | Refills: 0 | Status: DISCONTINUED | OUTPATIENT
Start: 2017-08-22 | End: 2017-08-22

## 2017-08-22 RX ORDER — RIVAROXABAN 15 MG-20MG
1 KIT ORAL
Qty: 30 | Refills: 0 | OUTPATIENT
Start: 2017-08-22 | End: 2017-09-21

## 2017-08-22 RX ORDER — FONDAPARINUX SODIUM 2.5 MG/.5ML
1 INJECTION, SOLUTION SUBCUTANEOUS
Qty: 42 | Refills: 0 | OUTPATIENT
Start: 2017-08-22 | End: 2017-09-12

## 2017-08-22 RX ORDER — ASPIRIN/CALCIUM CARB/MAGNESIUM 324 MG
1 TABLET ORAL
Qty: 0 | Refills: 0 | COMMUNITY
Start: 2017-08-22

## 2017-08-22 RX ORDER — RIVAROXABAN 15 MG-20MG
15 KIT ORAL
Qty: 0 | Refills: 0 | Status: DISCONTINUED | OUTPATIENT
Start: 2017-08-22 | End: 2017-08-22

## 2017-08-22 RX ORDER — ACETAMINOPHEN 500 MG
1 TABLET ORAL
Qty: 0 | Refills: 0 | COMMUNITY

## 2017-08-22 RX ADMIN — Medication 10 MILLIGRAM(S): at 02:18

## 2017-08-22 RX ADMIN — Medication 12.5 MICROGRAM(S): at 06:46

## 2017-08-22 RX ADMIN — LISINOPRIL 40 MILLIGRAM(S): 2.5 TABLET ORAL at 06:46

## 2017-08-22 RX ADMIN — Medication 1 TABLET(S): at 08:10

## 2017-08-22 RX ADMIN — RIVAROXABAN 15 MILLIGRAM(S): KIT at 10:21

## 2017-08-22 RX ADMIN — Medication 81 MILLIGRAM(S): at 13:08

## 2017-08-22 RX ADMIN — LIOTHYRONINE SODIUM 12.5 MICROGRAM(S): 25 TABLET ORAL at 10:21

## 2017-08-22 RX ADMIN — Medication 1 TABLET(S): at 09:15

## 2017-08-22 RX ADMIN — Medication 300 MILLIGRAM(S): at 11:00

## 2017-08-22 NOTE — PHYSICAL THERAPY INITIAL EVALUATION ADULT - DISCHARGE DISPOSITION, PT EVAL
DISCHARGE INPATIENT PT secondary to patient independent and at prior level of function/home/no skilled PT needs

## 2017-08-22 NOTE — PROGRESS NOTE ADULT - ATTENDING COMMENTS
Patient seen and examined with NP.  Agree with above.  Patient doing very well without neurological deficits.  Discussion in progress with Dr. Pinto regarding antiplatelet versus anticoagulation treatment for carotid dissections and the correct dosing for any of them.  Monitor his headaches until resolved.

## 2017-08-22 NOTE — PROGRESS NOTE ADULT - SUBJECTIVE AND OBJECTIVE BOX
cc: Patient is a 57y old  Male who presents with a chief complaint of Headache - h/o HTN - found to have bilateral internal carotid artery dissection    HPI:  57yom - h/o chronic fatigue syndrome, depression, HTN - sent to ED by ENT ([place]) for elevated blood pressure and headache.  Of note, hyperbaric oxygen 1 week ago at [facility] for suspected bends after scuba diving 3 weeks ago in [facility].  Following scuba diving patient experienced headache, nausea, unstable gait and inability to concentrate.  Following hyperbarix oxygen most symptoms improved, however, headache continued.  Headache improved with excedrin.  Headache described as throbbing.  Sent to er today for continued headache and bp elevations.  Now found to have bilateral carotid artery dissection.  Denies chest pain, dyspnea, palpitations, dizziness, lightheadedness, near-syncope, or syncope. No focal weakness.  Currently without acute complaints.  Patient now in SICU.      interval - no complaints; no acute o/n events    PAST MEDICAL & SURGICAL HISTORY:  HTN (hypertension)  Chronic fatigue syndrome  Depression  History of back surgery: lumbar spine  H/O hernia repair    MEDICATIONS  (STANDING):  lisinopril 40 milliGRAM(s) Oral daily  venlafaxine XR. 300 milliGRAM(s) Oral daily  insulin lispro (HumaLOG) corrective regimen sliding scale   SubCutaneous Before meals and at bedtime  dextrose 5%. 1000 milliLiter(s) (50 mL/Hr) IV Continuous <Continuous>  dextrose 50% Injectable 12.5 Gram(s) IV Push once  dextrose 50% Injectable 25 Gram(s) IV Push once  dextrose 50% Injectable 25 Gram(s) IV Push once  levothyroxine 12.5 MICROGram(s) Oral daily  aspirin  chewable 81 milliGRAM(s) Oral daily  mirtazapine 60 milliGRAM(s) Oral at bedtime  tamsulosin 0.4 milliGRAM(s) Oral at bedtime  heparin  Infusion 1500 Unit(s)/Hr (15 mL/Hr) IV Continuous <Continuous>    MEDICATIONS  (PRN):  labetalol Injectable 10 milliGRAM(s) IV Push every 6 hours PRN sbp> 140  dextrose Gel 1 Dose(s) Oral once PRN Blood Glucose LESS THAN 70 milliGRAM(s)/deciliter  glucagon  Injectable 1 milliGRAM(s) IntraMuscular once PRN Glucose LESS THAN 70 milligrams/deciliter  hydrALAZINE Injectable 10 milliGRAM(s) IV Push every 6 hours PRN sbp> 140  ketorolac   Injectable 30 milliGRAM(s) IV Push every 6 hours PRN Severe Pain (7 - 10)  acetaminophen 325 mG/butalbital 50 mG/caffeine 40 mG 1 Tablet(s) Oral every 6 hours PRN migraine headache        FAMILY HISTORY: father with mi leading to death at 41 yo     SOCIAL HISTORY: 1/2ppd smoking for approximately 15 years - quit 10 yrs ago    ICU Vital Signs Last 24 Hrs  T(C): 36.7 (22 Aug 2017 06:00), Max: 36.9 (21 Aug 2017 22:07)  T(F): 98.1 (22 Aug 2017 06:00), Max: 98.4 (21 Aug 2017 22:07)  HR: 76 (22 Aug 2017 07:00) (70 - 84)  BP: 134/83 (22 Aug 2017 07:00) (110/65 - 157/83)  BP(mean): 104 (22 Aug 2017 07:00) (81 - 117)  ABP: --  ABP(mean): --  RR: 7 (22 Aug 2017 07:00) (7 - 17)  SpO2: 97% (22 Aug 2017 07:00) (96% - 99%)    PHYSICAL EXAM:  Constitutional:nad; supine    Neck no jvd/hjr    Respiratory: bilat breath sounds present; no rales    Cardiovascular:rr; s1/s2 present; no diastolic murmur heard    Gastrointestinal:soft abd; no guarding    Extremities:no edema    Vascular:ue and le pulses present bilat    Neurological:conversant    Skin:warm    Psychiatric:appropriate    LABS:                                 14.2   7.6   )-----------( 313      ( 22 Aug 2017 05:55 )             41.2   08-22    131<L>  |  96  |  13  ----------------------------<  100<H>  4.3   |  25  |  0.70    Ca    9.3      22 Aug 2017 05:55  Phos  3.2     08-22  Mg     2.1     08-22    TPro  7.8  /  Alb  4.2  /  TBili  0.2  /  DBili  x   /  AST  27  /  ALT  35  /  AlkPhos  71  08-20      RADIOLOGY & ADDITIONAL STUDIES:  < from: CT Angio Abdomen and Pelvis w/ IV Cont (08.21.17 @ 11:56) >  EXAM:  CT ANGIO ABD PELV (W)AW IC                          PROCEDURE DATE:  08/21/2017    Quantity of Contrast in Vial in ml: 125 Contrast Used: Optiray 350  Quantity of Contrast Wasted in ml: 5           INTERPRETATION:  CLINICAL INDICATION: 37-year-old with carotid artery   dissection.        FINDINGS: CT angiography of the abdomen and pelvis was performed after   the administration of intravenous contrast. Multiplanar and maximum   intensity projection 3-D reconstructions were performed in the sagittal   and coronal planes. Reference is made to prior CT angiography of the neck   dated 8/20/2017.    Evaluation of the lower chest demonstrates normal heart size. Lower lung   parenchyma is unremarkable. Evaluation of the abdomen demonstratesthat   the liver, gallbladder, spleen, pancreas, are unremarkable. There is mild   edematous infiltration surrounding the left adrenal gland and to lesser   extent surrounding the right adrenal gland. Bilateral kidneys are normal   in appearance. Evaluation of the gastrointestinal tract demonstrates no   bowel thickening or bowel obstruction with normal appearance of the   appendix. Post surgical changes of the lower abdominal wall. Evaluation   pelvis demonstrates that the bladder is distended with fluid and rises   into the lower abdomen and consideration to catheterization is   recommended. The prostate gland and seminal vesicles are normal in   appearance. There are small bilateral fat-containing inguinal hernias.   There is no free fluid. No adenopathy. Evaluation of the osseous   structures demonstrates posterior lumbar fusion spanning from L4 to L5   and no destructive osseous lesion.    Evaluation of the vasculature demonstrates minimal vascular   calcification. There is aneurysmal dilatation and complex dissection flap   within the celiac artery measuring maximally 1.9 cm with peripheral   calcification, consistent with chronic nature. Superior mesenteric   artery, inferior mesenteric artery and bilateral renal arteries are   widely patent without stenosis, aneurysm or dissection. Negative for   aortic aneurysm, dissection or stenosis. There is saccular aneurysmal   dilatation of the right common iliac artery measuring 2.2 cm with   peripheral calcification. The left common iliac artery, bilateral   external iliac arteries and bilateral internal iliac arteries are normal   in appearance. Splenic, hepatic and gastroduodenal arteries are normal in   appearance.        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            "Thank you for the opportunity to participate in the care of this   patient."        REFUGIO ORR M.D., ATTENDING RADIOLOGIST  This document has been electronically signed. Aug 21 2017 12:09PM        < end of copied text >      < from: CT Angio Neck w/ IV Cont (08.20.17 @ 17:52) >  EXAM:  CT ANGIO BRAIN (W)AW IC                          EXAM:  CT ANGIO NECK (W)AW IC                          PROCEDURE DATE:  08/20/2017     67  Optiray 350   5           INTERPRETATION:  CT ANGIOGRAM OF THE CERVICAL ARTERIES   CTA OF THE [place]:    INDICATION: Headache after scuba diving 3 weeks prior.    TECHNIQUE:     CTA [place] OF [place]:     After the intravenous power injection of non-ionic contrast material,   serial thin sections were obtained through the intracranial circulation   on a multislice CT scanner followed by multiplanar 3-D reformations.   There are no prior studies.    CTA NECK:    After the intravenous power injection of non-ionic contrast material,   serial thin sections were obtained through the cervical circulation on a   multislice CT scanner followed by multiplanar 3-D reformations. There are   no prior studies.    FINDINGS:    CTA [place] OF [place]:    Posterior circulation demonstrates a hypoplastic right vertebral artery   that predominantly endsin PICA, normal variant. The distal left   vertebral artery and basilar artery are unremarkable. There is slight   ectasia of the basilar artery. Patient demonstrates bilateral fetal   origin PCAs with hypoplastic P1 segments bilaterally, normal variant. No   significant stenosis is seen in the intracranial posterior circulation.    No stenosis is seen in the M[place] bilaterally or MCA bilaterally. The   anterior communicating artery is hypoplastic, normal variant. No stenosis   is seen in the cavernous or supraclinoid ICA bilaterally..    CTA NECK:    Origins of the left subclavian artery, left common carotid artery, right   innominate artery, right subclavian artery, right common carotid artery   are normal. Left vertebral artery origin is patent. Left vertebral artery   is dominant. The right vertebral artery origin is patent. The right   vertebral artery is hypoplastic and predominantly ends in PICA, normal   variant.     At the carotid bifurcation, there is no significant stenosis.    There is marked abnormality of the left internal carotid artery in its   cervical portion, in which there is an abrupt change in caliber at   approximately the C3 level with a suggestion of an intimal flap   consistent with ICA dissection, and there is an area of flow-related   enhancement projecting lateral to the lumen consistent with   pseudoaneurysm. Beyond this area of abnormality, the caliber to the upper   cervical left ICA to the proximal petrous left ICA is thinner in caliber   but there isgood flow seen in the upper cervical left ICA and into the   left petrous ICA where the vessel assumes a more normal caliber in the   mid and distal left petrous ICA. Proximal to this dissection, the lumen   has a slightly nodular appearance suggestive of fibromuscular dysplasia   or mixed connective tissue disorder, which may be an etiology for the   dissection.    There is marked abnormality of the right internal carotid artery just   past its origin in which the lumen has a nodular appearance,which   suggests fibromuscular dysplasia or mixed connective tissue disorder. In   addition, in the upper right cervical ICA there is  an area of linear low   attenuation within the lumen at the C1-C2 level consistent with an   intimal flap of dissection. Medially, there is focal area of flow-related   enhancement suggestive of pseudoaneurysm. Beyond this there is thinner   caliber to the uppermost cervical right ICA extending into the proximal   right petrous ICA segment, which is not flow-limiting, and there is a   more normal caliber at to the mid and distal right petrous ICA.    There is multilevel degenerative disc disease in which findings loss of   disc space height and endplate sclerosis particularly at C5-C6 and C6-C7   levels. Thereappears be a left paracentral hypertrophic foraminal disc   protrusion at the C6-C7 level and a central disc protrusion at the C5-C6   level. There is multilevel degenerative osteoarthritis. Findings include   endplate osteophytosis and uncovertebralspurring. This results in   multilevel foraminal narrowing particularly at the C3-C4 level   bilaterally; left-sided at the C4-C5 level; bilaterally at the C5-C6   level; and left-sided at the C6-C7 level.    IMPRESSION:       CTA COW:      1. No intracranial stenosis of the [place].  2. Normal variants of anatomy including bilateral fetal origin PCAs and   hypoplastic right vertebral artery predominantly ending in PICA.    CTA NECK:    1. Bilateral ICA dissections with pseudoaneurysm formation bilaterally   and areas of concentric narrowing of the upper most cervical ICA   bilaterally, left more than right, extending into the proximal petrous   ICA bilaterally but there is good flow past the area of dissections   bilaterally and a more normal caliber to the mid and distal petrous ICA   bilaterally.  2. Nodularity to the ICA lumen bilaterally proximal to the dissection   suggest underlying mixed connective tissue disorder such as fibromuscular   dysplasia, which may be the underlying etiology for the bilateral   dissections.   3. Multilevel degenerative disc disease and osteoarthritis, particularly   at the C5-C6 and C6-C7 levels.  4. Hypoplastic right vertebral artery predominantly ends in PICA, normal   variant.    Results were discussed with Dr. Miller at the time of dictation.            "Thank you for the opportunity to participate in the care of this   patient."        SONYA NAVA M.D., ATTENDING RADIOLOGIST  This document has been electronically signed. Aug 20 2017  6:26PM        < end of copied text >  < from: CT Head No Cont (08.20.17 @ 17:51) >  EXAM:  CT BRAIN                          PROCEDURE DATE:  08/20/2017                     INTERPRETATION:  INDICATIONS: Headache after scuba diving 3 weeks prior.    TECHNIQUE:  Serial axial images were obtained from the skull base to the   vertex without the use of intravenous contrast.    COMPARISON EXAMINATION: None.    FINDINGS:    VENTRICLES AND SULCI: Parenchymal volume loss is present which is   commensurate with patient age.   INTRA-AXIAL: No intracranial mass, acute hemorrhage, or midline shift is   present. Gray-white differentiation is preserved.  EXTRA-AXIAL: No extra-axial fluid collection is present.   VISUALIZED SINUSES: No air-fluid levels are identified. There is minimal   mucosal thickening in the ethmoid air cells bilaterally. VISUALIZED   MASTOIDS:  There is minor opacification within the mastoid air cells   bilaterally.  CALVARIUM: No fracture is seen.    IMPRESSION:    No CT evidence of acute intracranial hemorrhage and no apparent acute   abnormality.            "Thank you for the opportunity to participate in the care of this   patient."        SONYA NAVA M.D., ATTENDING RADIOLOGIST  This document has been electronically signed. Aug 20 2017  5:49PM    < end of copied text >

## 2017-08-22 NOTE — PROGRESS NOTE ADULT - ASSESSMENT
A/recs:  1) bilateral internal carotid artery dissection  -note ct with aneurysmal dilatation and complex dissection flap within the celiac artery and saccular aneurysmal dilatation of the right common iliac artery per Radiology - recommend Rheumatology evaluation prior to discharge  -ac as tolerated - oral ac per primary team  -recommend check lipid panel  2) htn - bp control improving on hydralazine, lisinopril, and labetalol   -echo remains pending - recommend echocardiogram this admission  -please check tsh, free t3 and free t4  -please check bnp  -change to po rx when ok with primary team  3) hyponatremia - renal following  4) chronic fatigue and depression - per primary team  5) K>4, Mg>2  recommend Cardiology follow-up within 1 week of discharge - patient aware

## 2017-08-22 NOTE — PROGRESS NOTE ADULT - PROBLEM SELECTOR PLAN 1
Hyponatremia likely due to ADH stimulus due to headache/pain/SNRIs/diuretic use.  Na improving to 131 at present. Pain controlled at present.  Denies any headache at present.  Will advised free water restriction to <1 to 1.2 L/day  Continue current treatment  Monitor BMP and Urine electrolytes

## 2017-08-22 NOTE — DIETITIAN INITIAL EVALUATION ADULT. - PROBLEM SELECTOR PLAN 1
Continue heparin gtt (PTT goal 60-80)  BP control (-140)  Aspirin daily  NPO after midnight/IVF  Home medication  Neuro checks q1hr  AM labs

## 2017-08-22 NOTE — PROGRESS NOTE ADULT - PROBLEM SELECTOR PLAN 2
Work up for vasculitis, collagen vascular disease, RPR, DIXIE to further evaluate the underlying etiology for multiple vascular dissections  Genetic testing for marfan's/ehler-danlos syndrome.  Optimal BP control with po medication  No renal involvement at present.

## 2017-08-22 NOTE — PHYSICAL THERAPY INITIAL EVALUATION ADULT - GENERAL OBSERVATIONS, REHAB EVAL
Patient received standing in room +EKG, IV heplock. Left seated in chair in NAD + call NORIS kelley aware

## 2017-08-22 NOTE — PROGRESS NOTE ADULT - PROBLEM SELECTOR PLAN 3
BP controlled on PO lisinopril and IV hydralazine and labetalol for now.  Can switch to po medicaions for now.  Optimal BP control  Low salt intake

## 2017-08-22 NOTE — PROGRESS NOTE ADULT - ASSESSMENT
57 year male with PMHx of HLD, Depression, GERD, BPH with recent history of travel to [facility] with scuba diving now admitted with bilateral carotid dissection. Nephrology consulted to evaluate for possible fibromuscular dysplasia of renal vessel as well as hyponatremia. Na level 131 at present. BP fairly controlled at present on medications.

## 2017-08-22 NOTE — DIETITIAN INITIAL EVALUATION ADULT. - OTHER INFO
56 y/o M with B/l Carotid dissection post scuba diving. Tolerating regular diet well both PTA & currently. States stable weight PTA & regular BMs. No difficulty with chewing/swallowing.

## 2017-08-22 NOTE — PHYSICAL THERAPY INITIAL EVALUATION ADULT - PERTINENT HX OF CURRENT PROBLEM, REHAB EVAL
57M who presents to [facility] ED with headache since 8/2/2017. Pt  went scuba diving 8/2 and afterwards developed headache, nausea, unstable gait and inability to concentrate. MRI head (-) for acute infarct

## 2017-08-22 NOTE — PROGRESS NOTE ADULT - SUBJECTIVE AND OBJECTIVE BOX
Patient is a 57y Male seen and evaluated at bedside. Patient feels fine. Denies any chest pain, shortness of breath, palpitations, dizziness at present. Denies any abdominal pain, nausea, vomiting, diarrhea at present. Denies any tingling or numbness at present. No limb weakness at present.      labetalol Injectable 10 every 6 hours PRN  lisinopril 40 daily  venlafaxine XR. 300 daily  insulin lispro (HumaLOG) corrective regimen sliding scale  Before meals and at bedtime  dextrose 5%. 1000 <Continuous>  dextrose Gel 1 once PRN  dextrose 50% Injectable 12.5 once  dextrose 50% Injectable 25 once  dextrose 50% Injectable 25 once  glucagon  Injectable 1 once PRN  levothyroxine 12.5 daily  aspirin  chewable 81 daily  mirtazapine 60 at bedtime  tamsulosin 0.4 at bedtime  hydrALAZINE Injectable 10 every 6 hours PRN  ketorolac   Injectable 30 every 6 hours PRN  acetaminophen 325 mG/butalbital 50 mG/caffeine 40 mG 1 every 6 hours PRN  rivaroxaban 15 two times a day  liothyronine 12.5 daily      Allergies    No Known Allergies    Intolerances        T(C): , Max: 36.9 (17 @ 22:07)  T(F): , Max: 98.4 (17 @ 22:07)  HR: 82 (17 @ 11:00)  BP: 146/91 (17 @ 11:00)  BP(mean): 110 (17 @ 11:00)  RR: 19 (17 @ 11:00)  SpO2: 97% (17 @ 11:00)  Wt(kg): --     @ 07:01  -   @ 07:00  --------------------------------------------------------  IN: 621 mL / OUT: 3200 mL / NET: -2579 mL     @ 07:01  -   @ 11:22  --------------------------------------------------------  IN: 213 mL / OUT: 0 mL / NET: 213 mL      Height (cm): 180.3 ( @ 04:52)    Review of Systems:    CONSTITUTIONAL: No fever or chills, weight loss, or fatigue  RESPIRATORY: No cough, wheezing, chills or hemoptysis; No shortness of breath  CARDIOVASCULAR: No chest pain, palpitations, dizziness, or leg swelling  GASTROINTESTINAL: No abdominal or epigastric pain. No nausea, vomiting, or hematemesis; No diarrhea or constipation. No melena or hematochezia.  GENITOURINARY: No dysuria, frequency, hematuria, or incontinence  NEUROLOGICAL: No headaches, memory loss, loss of strength, numbness, or tremors  SKIN: No new rash or lesions  MUSCULOSKELETAL: No joint pain or swelling; No muscle, back, or extremity pain, no leg edema      PHYSICAL EXAM:  GENERAL: NAD, well-developed, well nourished, alert, awake, no acute distress at present  HEAD:  Atraumatic, Normocephalic,   EYES: EOMI, ANDREW, conjunctiva and sclera clear, no nystagmus  Oral cavity: Oral mucosa dry and pink, no oral lesions or ulcers  NECK: Neck supple, No JVD, no palpable thyromegaly  CHEST/LUNG: Clear to auscultation bilaterally; No wheeze, no rales, no crepitations  HEART: Regular rate and rhythm. MT II/VI at LPSB, No gallop, no rub   ABDOMEN: Soft, Nontender, Nondistended; Bowel sounds present, no guarding, no rigidity, no rebound tenderness, No flank or CVA angle tenderness, no palpable or pulsatile mass noted.   EXTREMITIES: No clubbing, cyanosis, or edema, no calf tenderness  Neurology: AAOx3, no focal neurological deficit. Motor 5/5 all 4 extremities. Able to comprehend and answers all questions appropriately.    SKIN: No rashes or lesions          ACCESS:     LABS:                        14.2   7.6   )-----------( 313      ( 22 Aug 2017 05:55 )             41.2         131<L>  |  96  |  13  ----------------------------<  100<H>  4.3   |  25  |  0.70    Ca    9.3      22 Aug 2017 05:55  Phos  3.2       Mg     2.1         TPro  7.8  /  Alb  4.2  /  TBili  0.2  /  DBili  x   /  AST  27  /  ALT  35  /  AlkPhos  71  08-    Osmolality, Serum: 272 mosm/kg <L> [280 - 301] ( @ 12:49)    PT/INR - ( 22 Aug 2017 05:55 )   PT: 11.6 sec;   INR: 1.04          PTT - ( 22 Aug 2017 07:37 )  PTT:100.4 sec  Urinalysis Basic - ( 21 Aug 2017 07:12 )    Color: Yellow / Appearance: Clear / S.010 / pH: x  Gluc: x / Ketone: 40 mg/dL  / Bili: NEGATIVE / Urobili: 0.2 E.U./dL   Blood: x / Protein: NEGATIVE mg/dL / Nitrite: NEGATIVE   Leuk Esterase: NEGATIVE / RBC: x / WBC x   Sq Epi: x / Non Sq Epi: x / Bacteria: x      Osmolality, Random Urine: 446 mosmol/kg ( @ 14:11)  Osmolality, Random Urine: 491 mosmol/kg ( @ 07:13)  Sodium, Random Urine: 72 mmol/L ( @ 07:13)        RADIOLOGY & ADDITIONAL STUDIES: Patient is a 57y Male seen and evaluated at bedside. Patient feels fine. Denies any chest pain, shortness of breath, palpitations, dizziness at present. Denies any abdominal pain, nausea, vomiting, diarrhea at present. Denies any tingling or numbness at present. No limb weakness at present.      labetalol Injectable 10 every 6 hours PRN  lisinopril 40 daily  venlafaxine XR. 300 daily  insulin lispro (HumaLOG) corrective regimen sliding scale  Before meals and at bedtime  dextrose 5%. 1000 <Continuous>  dextrose Gel 1 once PRN  dextrose 50% Injectable 12.5 once  dextrose 50% Injectable 25 once  dextrose 50% Injectable 25 once  glucagon  Injectable 1 once PRN  levothyroxine 12.5 daily  aspirin  chewable 81 daily  mirtazapine 60 at bedtime  tamsulosin 0.4 at bedtime  hydrALAZINE Injectable 10 every 6 hours PRN  ketorolac   Injectable 30 every 6 hours PRN  acetaminophen 325 mG/butalbital 50 mG/caffeine 40 mG 1 every 6 hours PRN  rivaroxaban 15 two times a day  liothyronine 12.5 daily      Allergies    No Known Allergies    Intolerances        T(C): , Max: 36.9 (17 @ 22:07)  T(F): , Max: 98.4 (17 @ 22:07)  HR: 82 (17 @ 11:00)  BP: 146/91 (17 @ 11:00)  BP(mean): 110 (17 @ 11:00)  RR: 19 (17 @ 11:00)  SpO2: 97% (17 @ 11:00)  Wt(kg): --     @ 07:01  -   @ 07:00  --------------------------------------------------------  IN: 621 mL / OUT: 3200 mL / NET: -2579 mL     @ 07:01  -   @ 11:22  --------------------------------------------------------  IN: 213 mL / OUT: 0 mL / NET: 213 mL      Height (cm): 180.3 ( @ 04:52)    Review of Systems:    CONSTITUTIONAL: No fever or chills, weight loss, or fatigue  RESPIRATORY: No cough, wheezing, chills or hemoptysis; No shortness of breath  CARDIOVASCULAR: No chest pain, palpitations, dizziness, or leg swelling  GASTROINTESTINAL: No abdominal or epigastric pain. No nausea, vomiting, or hematemesis; No diarrhea or constipation. No melena or hematochezia.  GENITOURINARY: No dysuria, frequency, hematuria, or incontinence  NEUROLOGICAL: No headaches, memory loss, loss of strength, numbness, or tremors  SKIN: No new rash or lesions  MUSCULOSKELETAL: No joint pain or swelling; No muscle, back, or extremity pain, no leg edema      PHYSICAL EXAM:  GENERAL: NAD, well-developed, well nourished, alert, awake, no acute distress at present  HEAD:  Atraumatic, Normocephalic,   EYES: EOMI, ANDREW, conjunctiva and sclera clear, no nystagmus  Oral cavity: Oral mucosa dry and pale, no oral lesions or ulcers  NECK: Neck supple, No JVD, no palpable thyromegaly  CHEST/LUNG: Clear to auscultation bilaterally; No wheeze, no rales, no crepitations  HEART: Regular rate and rhythm. MT II/VI at LPSB, No gallop, no rub   ABDOMEN: Soft, Nontender, Nondistended; Bowel sounds present, no guarding, no rigidity, no rebound tenderness, No flank or CVA angle tenderness, no palpable or pulsatile mass noted.   EXTREMITIES: No clubbing, cyanosis, or edema, no calf tenderness  Neurology: AAOx3, no focal neurological deficit. Motor 5/5 all 4 extremities. Able to comprehend and answers all questions appropriately.    SKIN: No rashes or lesions          ACCESS:     LABS:                        14.2   7.6   )-----------( 313      ( 22 Aug 2017 05:55 )             41.2         131<L>  |  96  |  13  ----------------------------<  100<H>  4.3   |  25  |  0.70    Ca    9.3      22 Aug 2017 05:55  Phos  3.2       Mg     2.1         TPro  7.8  /  Alb  4.2  /  TBili  0.2  /  DBili  x   /  AST  27  /  ALT  35  /  AlkPhos  71  -    Osmolality, Serum: 272 mosm/kg <L> [280 - 301] ( @ 12:49)    PT/INR - ( 22 Aug 2017 05:55 )   PT: 11.6 sec;   INR: 1.04          PTT - ( 22 Aug 2017 07:37 )  PTT:100.4 sec  Urinalysis Basic - ( 21 Aug 2017 07:12 )    Color: Yellow / Appearance: Clear / S.010 / pH: x  Gluc: x / Ketone: 40 mg/dL  / Bili: NEGATIVE / Urobili: 0.2 E.U./dL   Blood: x / Protein: NEGATIVE mg/dL / Nitrite: NEGATIVE   Leuk Esterase: NEGATIVE / RBC: x / WBC x   Sq Epi: x / Non Sq Epi: x / Bacteria: x      Osmolality, Random Urine: 446 mosmol/kg ( @ 14:11)  Osmolality, Random Urine: 491 mosmol/kg ( @ 07:13)  Sodium, Random Urine: 72 mmol/L ( @ 07:13)        RADIOLOGY & ADDITIONAL STUDIES:  < from: MRI Head w/o Cont (17 @ 23:22) >    EXAM:  MR BRAIN WO CONTRAST                          PROCEDURE DATE:  2017                     INTERPRETATION:  Clinical history:/l carotid dissection bilateral carotid   dissection    Technique: MRI of the brain was performed utilizing sagittal and axial   T1, axial T2, axial gradient echo, axial and coronal FLAIR and diffusion   imaging.    There are no prior studies available for comparison.    Findings: There are several scattered punctate foci of T2 and Flair   signal hyperintensities within the white matter that are nonspecific.   There is no evidence of mass-effect or midline shift. There is no   evidence of intra or extra-axial fluid collection. The ventricles are of   normal size and caliber. There is no evidence of acute infarction.   Evaluation of the intracranial vascular flow-voids demonstrates   curvilinear T2 signal hyperintensity within the walls of the carotid   arteries consistent with dissections. The visualized paranasal sinuses   air cells are clear. There is partial opacification of the bilateral   mastoid air cells consistent with mastoiditis versus effusions.      Impression: Nonspecific several scattered punctate foci of T2 and Flair   signal hyperintensities within the white matter; findings may be seen in   patient with migraine headaches, vasculitis, microvascular disease,   demyelinating disease, Lyme disease and viral illness.  No evidence of   acute infarction.    Bilateral prepetrous internal carotid artery dissections.              "Thank you for the opportunity to participate in the care of this   patient."        ORLANDO PUCKETT M.D., ATTENDING RADIOLOGIST  This document has been electronically signed. Aug 22 2017  9:25AM                  < end of copied text >

## 2017-08-22 NOTE — PHYSICAL THERAPY INITIAL EVALUATION ADULT - MODALITIES TREATMENT COMMENTS
Facial nerve: smile/cheek puff/eyebrow elevation symmetrical; Tongue: midline; Visual fields grossly intact

## 2017-08-22 NOTE — PROGRESS NOTE ADULT - SUBJECTIVE AND OBJECTIVE BOX
Stroke Neurology Follow-Up Visit    Pt seen and examined.   Continued on Heparin gtt.   Pt reports headache much improved compared to yesterday. Slept well overnight.   Denies nausea, vomiting, vision changes, speech changes, unilateral weakness, or numbness.   Plan for stepdown to 5 uris today.     Exam:  T(F): 97.2 (08-22-17 @ 13:44), Max: 98.4 (08-21-17 @ 22:07)  HR: 88 (08-22-17 @ 11:51) (70 - 88)  BP: 123/88 (08-22-17 @ 11:51) (110/65 - 157/83)  RR: 16 (08-22-17 @ 11:51) (7 - 19)  SpO2: 97% (08-22-17 @ 11:51) (96% - 99%)    Physical exam:   Gen: sitting in bed, no acute distress, well-nourished  Carotid arteries- no bruits,   CV: reg rate and rhythm  Extremities: 2+ radial pulses bilaterally    Neurological examination:  Mental status: awake, alert and oriented x3.  Follows all commands. Naming, repetition, and comprehension intact. No aphasia. Speech is fluent and spontaneous. No dysarthria. Fund of knowledge appropriate.   Cranial nerves: pupils equally round and reactive to light, 3 mm, visual fields full, no nystagmus, extraocular muscles intact, V1 through V3 intact bilaterally and symmetric, face symmetric, hearing intact to finger rub, palate elevation symmetric, tongue was midline, sternocleidomastoid/shoulder shrug strength bilaterally 5/5.    Motor:  Normal bulk and tone, no pronator drift of upper or lower extremities, strength 5/5 in bilateral upper and lower extremities.  Sensation: Intact and symmetric to light touch.  No neglect.   Coordination: No dysmetria on finger-to-nose bilaterally. No clumsiness.   Reflexes: absent Babinski bilaterally  Gait: deferred    MEDICATIONS  (STANDING):  lisinopril 40 milliGRAM(s) Oral daily  venlafaxine XR. 300 milliGRAM(s) Oral daily  insulin lispro (HumaLOG) corrective regimen sliding scale   SubCutaneous Before meals and at bedtime  levothyroxine 12.5 MICROGram(s) Oral daily  aspirin  chewable 81 milliGRAM(s) Oral daily  mirtazapine 60 milliGRAM(s) Oral at bedtime  tamsulosin 0.4 milliGRAM(s) Oral at bedtime  rivaroxaban 15 milliGRAM(s) Oral two times a day  liothyronine 12.5 MICROGram(s) Oral daily    MEDICATIONS  (PRN):  labetalol Injectable 10 milliGRAM(s) IV Push every 6 hours PRN sbp> 140  dextrose Gel 1 Dose(s) Oral once PRN Blood Glucose LESS THAN 70 milliGRAM(s)/deciliter  glucagon  Injectable 1 milliGRAM(s) IntraMuscular once PRN Glucose LESS THAN 70 milligrams/deciliter  hydrALAZINE Injectable 10 milliGRAM(s) IV Push every 6 hours PRN sbp> 140  ketorolac   Injectable 30 milliGRAM(s) IV Push every 6 hours PRN Severe Pain (7 - 10)  acetaminophen 325 mG/butalbital 50 mG/caffeine 40 mG 1 Tablet(s) Oral every 6 hours PRN migraine headache    Labs:                        14.2   7.6   )-----------( 313      ( 22 Aug 2017 05:55 )             41.2     08-22    131<L>  |  96  |  13  ----------------------------<  100<H>  4.3   |  25  |  0.70    Ca    9.3      22 Aug 2017 05:55  Phos  3.2     08-22  Mg     2.1     08-22    TPro  7.8  /  Alb  4.2  /  TBili  0.2  /  DBili  x   /  AST  27  /  ALT  35  /  AlkPhos  71  08-20    LIVER FUNCTIONS - ( 20 Aug 2017 16:25 )  Alb: 4.2 g/dL / Pro: 7.8 g/dL / ALK PHOS: 71 U/L / ALT: 35 U/L / AST: 27 U/L / GGT: x           PT/INR - ( 22 Aug 2017 05:55 )   PT: 11.6 sec;   INR: 1.04     PTT - ( 22 Aug 2017 07:37 )  PTT:100.4 sec    Radiology:  CT Angio Abdomen and pelvis with IV contrast (8/21/17):  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    CT Head No Cont (08.20.17 @ 17:51) - No CT evidence of acute intracranial hemorrhage and no apparent acute abnormality.    CT Angio Neck with IV Contrast 8/20/17:     CTA COW:    1. No intracranial stenosis of the [place].  2. 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    CTA NECK:  1. 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  2. Nodularity to the ICA lumen bilaterally proximal to the dissection suggest underlying mixed connective tissue disorder such as fibromuscular dysplasia, which may be the underlying etiology for the bilateral dissections.   3. Multilevel degenerative disc disease and osteoarthritis, particularly at the C5-C6 and C6-C7 levels.  4. 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    < from: MRI Head w/o Cont (08.21.17 @ 23:22) >  Impression: Nonspecific several scattered punctate foci of T2 and Flair   signal hyperintensities within the white matter; findings may be seen in   patient with migraine headaches, vasculitis, microvascular disease,   demyelinating disease, Lyme disease and viral illness.  No evidence of   acute infarction.    Bilateral prepetrous internal carotid artery dissections.    Assessment: 57 year-old right handed male presents with chronic fatigue syndrome, depression, HTN, presents with headache since scuba diving on 8/2. Had persistent headache despite hyperbaric chamber treatment and was referred to ED by outpatient ENT. On vessel imaging, found to have multiple vessel dissections.     -MRI head w/o ranjeet negative for acute infarct   -hypercoagulable work up and vasculitis work up sent to rule out underlying etiology for vessel compromise. Was seen by rheumatology as well. Stroke Neurology Follow-Up Visit    Pt seen and examined.   Had MRI Brain last night  Continued on Heparin gtt. No bleeding in urine or stool.  Pt reports headache much improved compared to yesterday. Slept well overnight.   Denies nausea, vomiting, vision changes, speech changes, unilateral weakness, or numbness.   Plan for stepdown to 5 uris today.     Exam:  T(F): 97.2 (08-22-17 @ 13:44), Max: 98.4 (08-21-17 @ 22:07)  HR: 88 (08-22-17 @ 11:51) (70 - 88)  BP: 123/88 (08-22-17 @ 11:51) (110/65 - 157/83)  RR: 16 (08-22-17 @ 11:51) (7 - 19)  SpO2: 97% (08-22-17 @ 11:51) (96% - 99%)    Physical exam:   Gen: sitting in bed, no acute distress, well-nourished  Carotid arteries- no bruits,   CV: reg rate and rhythm  Extremities: 2+ radial pulses bilaterally    Neurological examination:  Mental status: awake, alert and oriented x3.  Follows all commands. Naming, repetition, and comprehension intact. No aphasia. Speech is fluent and spontaneous. No dysarthria. Fund of knowledge appropriate.   Cranial nerves: pupils equally round and reactive to light, 3 mm, visual fields full, no nystagmus, extraocular muscles intact, V1 through V3 intact bilaterally and symmetric, face symmetric, hearing intact to finger rub, palate elevation symmetric, tongue was midline, sternocleidomastoid/shoulder shrug strength bilaterally 5/5.    Motor:  Normal bulk and tone, no pronator drift of upper or lower extremities, strength 5/5 in bilateral upper and lower extremities.  Sensation: Intact and symmetric to light touch.  No neglect.   Coordination: No dysmetria on finger-to-nose bilaterally. No clumsiness.   Reflexes: absent Babinski bilaterally  Gait: deferred    MEDICATIONS  (STANDING):  lisinopril 40 milliGRAM(s) Oral daily  venlafaxine XR. 300 milliGRAM(s) Oral daily  insulin lispro (HumaLOG) corrective regimen sliding scale   SubCutaneous Before meals and at bedtime  levothyroxine 12.5 MICROGram(s) Oral daily  aspirin  chewable 81 milliGRAM(s) Oral daily  mirtazapine 60 milliGRAM(s) Oral at bedtime  tamsulosin 0.4 milliGRAM(s) Oral at bedtime  rivaroxaban 15 milliGRAM(s) Oral two times a day  liothyronine 12.5 MICROGram(s) Oral daily    MEDICATIONS  (PRN):  labetalol Injectable 10 milliGRAM(s) IV Push every 6 hours PRN sbp> 140  hydrALAZINE Injectable 10 milliGRAM(s) IV Push every 6 hours PRN sbp> 140  ketorolac   Injectable 30 milliGRAM(s) IV Push every 6 hours PRN Severe Pain (7 - 10)  acetaminophen 325 mG/butalbital 50 mG/caffeine 40 mG 1 Tablet(s) Oral every 6 hours PRN migraine headache    Labs:                        14.2   7.6   )-----------( 313      ( 22 Aug 2017 05:55 )             41.2     08-22    131<L>  |  96  |  13  ----------------------------<  100<H>  4.3   |  25  |  0.70    Ca    9.3      22 Aug 2017 05:55  Phos  3.2     08-22  Mg     2.1     08-22    LIVER FUNCTIONS - ( 20 Aug 2017 16:25 )  Alb: 4.2 g/dL / Pro: 7.8 g/dL / ALK PHOS: 71 U/L / ALT: 35 U/L / AST: 27 U/L / GGT: x           PT/INR - ( 22 Aug 2017 05:55 )   PT: 11.6 sec;   INR: 1.04     PTT - ( 22 Aug 2017 07:37 )  PTT:100.4 sec    Radiology:  CT Angio Abdomen and pelvis with IV contrast (8/21/17):  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    CT Head No Cont (08.20.17 @ 17:51) - No CT evidence of acute intracranial hemorrhage and no apparent acute abnormality.    CT Angio Neck with IV Contrast 8/20/17:     CTA COW:    1. No intracranial stenosis of the [place].  2. 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    CTA NECK:  1. 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  2. Nodularity to the ICA lumen bilaterally proximal to the dissection suggest underlying mixed connective tissue disorder such as fibromuscular dysplasia, which may be the underlying etiology for the bilateral dissections.   3. Multilevel degenerative disc disease and osteoarthritis, particularly at the C5-C6 and C6-C7 levels.  4. 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    MRI Head w/o Cont (08.21.17 @ 23:22) >  Impression: Nonspecific several scattered punctate foci of T2 and Flair   signal hyperintensities within the white matter; findings may be seen in   patient with migraine headaches, vasculitis, microvascular disease,   demyelinating disease, Lyme disease and viral illness.  No evidence of   acute infarction.    Bilateral pre-petrous internal carotid artery dissections.    Assessment: 57 year-old right handed male presents with chronic fatigue syndrome, depression, HTN, presents with headache since scuba diving on 8/2. Had persistent headache despite hyperbaric chamber treatment as recommended by outpatient ENT. On vessel imaging, found to have multiple vessel dissections that could explain his headache.     -MRI head w/o ranjeet negative for acute infarct   -hypercoagulable work up and vasculitis work up sent to rule out underlying etiology for vessel compromise. Was seen by rheumatology as well.

## 2017-08-23 LAB
AT III ACT/NOR PPP CHRO: 88 % — SIGNIFICANT CHANGE UP (ref 85–135)
B2 GLYCOPROT1 AB SER QL: NEGATIVE — SIGNIFICANT CHANGE UP
CARDIOLIPIN AB SER-ACNC: POSITIVE
DRVVT SCREEN TO CONFIRM RATIO: SIGNIFICANT CHANGE UP
HCYS SERPL-MCNC: 5 UMOL/L — SIGNIFICANT CHANGE UP (ref 5–15)
LA NT DPL PPP QL: 23.7 SEC — SIGNIFICANT CHANGE UP
PROT C ACT/NOR PPP: 106 % — SIGNIFICANT CHANGE UP (ref 74–150)

## 2017-08-24 DIAGNOSIS — I72.8 ANEURYSM OF OTHER SPECIFIED ARTERIES: ICD-10-CM

## 2017-08-24 DIAGNOSIS — I77.79 DISSECTION OF OTHER SPECIFIED ARTERY: ICD-10-CM

## 2017-08-24 DIAGNOSIS — F32.9 MAJOR DEPRESSIVE DISORDER, SINGLE EPISODE, UNSPECIFIED: ICD-10-CM

## 2017-08-24 DIAGNOSIS — I10 ESSENTIAL (PRIMARY) HYPERTENSION: ICD-10-CM

## 2017-08-24 DIAGNOSIS — Z82.49 FAMILY HISTORY OF ISCHEMIC HEART DISEASE AND OTHER DISEASES OF THE CIRCULATORY SYSTEM: ICD-10-CM

## 2017-08-24 DIAGNOSIS — N40.0 BENIGN PROSTATIC HYPERPLASIA WITHOUT LOWER URINARY TRACT SYMPTOMS: ICD-10-CM

## 2017-08-24 DIAGNOSIS — E22.2 SYNDROME OF INAPPROPRIATE SECRETION OF ANTIDIURETIC HORMONE: ICD-10-CM

## 2017-08-24 DIAGNOSIS — R26.81 UNSTEADINESS ON FEET: ICD-10-CM

## 2017-08-24 DIAGNOSIS — I72.3 ANEURYSM OF ILIAC ARTERY: ICD-10-CM

## 2017-08-24 DIAGNOSIS — I77.71 DISSECTION OF CAROTID ARTERY: ICD-10-CM

## 2017-08-24 DIAGNOSIS — E03.9 HYPOTHYROIDISM, UNSPECIFIED: ICD-10-CM

## 2017-08-24 LAB
ANA TITR SER: NEGATIVE — SIGNIFICANT CHANGE UP
IGA FLD-MCNC: 56 MG/DL — LOW (ref 68–378)
IGG FLD-MCNC: 861 MG/DL — SIGNIFICANT CHANGE UP (ref 694–1618)
IGM SERPL-MCNC: 101 MG/DL — SIGNIFICANT CHANGE UP (ref 40–230)
KAPPA LC SER QL IFE: 1.26 MG/DL — SIGNIFICANT CHANGE UP (ref 0.33–1.94)
KAPPA/LAMBDA FREE LIGHT CHAIN RATIO, SERUM: 1.7 RATIO — HIGH (ref 0.26–1.65)
LAMBDA LC SER QL IFE: 0.74 MG/DL — SIGNIFICANT CHANGE UP (ref 0.57–2.63)

## 2017-08-25 PROBLEM — F32.9 MAJOR DEPRESSIVE DISORDER, SINGLE EPISODE, UNSPECIFIED: Chronic | Status: ACTIVE | Noted: 2017-08-20

## 2017-08-25 PROBLEM — R53.82 CHRONIC FATIGUE, UNSPECIFIED: Chronic | Status: ACTIVE | Noted: 2017-08-20

## 2017-08-25 PROBLEM — Z00.00 ENCOUNTER FOR PREVENTIVE HEALTH EXAMINATION: Status: ACTIVE | Noted: 2017-08-25

## 2017-08-25 LAB
% ALBUMIN: 59.5 % — SIGNIFICANT CHANGE UP
% ALPHA 1: 4.9 % — SIGNIFICANT CHANGE UP
% ALPHA 2: 12.2 % — SIGNIFICANT CHANGE UP
% BETA: 9.3 % — SIGNIFICANT CHANGE UP
% GAMMA: 14.1 % — SIGNIFICANT CHANGE UP
ALBUMIN SERPL ELPH-MCNC: 3.7 G/DL — SIGNIFICANT CHANGE UP (ref 3.6–5.5)
ALBUMIN/GLOB SERPL ELPH: 1.5 RATIO — SIGNIFICANT CHANGE UP
ALPHA1 GLOB SERPL ELPH-MCNC: 0.3 G/DL — SIGNIFICANT CHANGE UP (ref 0.1–0.4)
ALPHA2 GLOB SERPL ELPH-MCNC: 0.8 G/DL — SIGNIFICANT CHANGE UP (ref 0.5–1)
B-GLOBULIN SERPL ELPH-MCNC: 0.6 G/DL — SIGNIFICANT CHANGE UP (ref 0.5–1)
GAMMA GLOBULIN: 0.9 G/DL — SIGNIFICANT CHANGE UP (ref 0.6–1.6)
INTERPRETATION SERPL IFE-IMP: SIGNIFICANT CHANGE UP
PROT PATTERN SERPL ELPH-IMP: SIGNIFICANT CHANGE UP

## 2017-08-26 LAB — PROT S FREE PPP-ACNC: 105 % NORMAL — SIGNIFICANT CHANGE UP (ref 70–150)

## 2017-08-31 ENCOUNTER — APPOINTMENT (OUTPATIENT)
Dept: VASCULAR SURGERY | Facility: CLINIC | Age: 57
End: 2017-08-31
Payer: COMMERCIAL

## 2017-08-31 DIAGNOSIS — I77.71 DISSECTION OF CAROTID ARTERY: ICD-10-CM

## 2017-08-31 DIAGNOSIS — I72.3 ANEURYSM OF ILIAC ARTERY: ICD-10-CM

## 2017-08-31 DIAGNOSIS — Z87.891 PERSONAL HISTORY OF NICOTINE DEPENDENCE: ICD-10-CM

## 2017-08-31 DIAGNOSIS — Z86.79 PERSONAL HISTORY OF OTHER DISEASES OF THE CIRCULATORY SYSTEM: ICD-10-CM

## 2017-08-31 DIAGNOSIS — I72.8 ANEURYSM OF OTHER SPECIFIED ARTERIES: ICD-10-CM

## 2017-08-31 DIAGNOSIS — I77.79 DISSECTION OF OTHER SPECIFIED ARTERY: ICD-10-CM

## 2017-08-31 DIAGNOSIS — Z86.39 PERSONAL HISTORY OF OTHER ENDOCRINE, NUTRITIONAL AND METABOLIC DISEASE: ICD-10-CM

## 2017-08-31 DIAGNOSIS — Z86.59 PERSONAL HISTORY OF OTHER MENTAL AND BEHAVIORAL DISORDERS: ICD-10-CM

## 2017-08-31 DIAGNOSIS — Z87.898 PERSONAL HISTORY OF OTHER SPECIFIED CONDITIONS: ICD-10-CM

## 2017-08-31 DIAGNOSIS — Z78.9 OTHER SPECIFIED HEALTH STATUS: ICD-10-CM

## 2017-08-31 PROCEDURE — 99213 OFFICE O/P EST LOW 20 MIN: CPT

## 2017-09-01 PROBLEM — Z78.9 ALCOHOL INGESTION: Status: ACTIVE | Noted: 2017-08-31

## 2017-09-01 PROBLEM — Z86.59 HISTORY OF DEPRESSION: Status: RESOLVED | Noted: 2017-09-01 | Resolved: 2017-09-01

## 2017-09-01 PROBLEM — Z87.898 HISTORY OF CHRONIC FATIGUE SYNDROME: Status: RESOLVED | Noted: 2017-09-01 | Resolved: 2017-09-01

## 2017-09-01 PROBLEM — Z86.79 HISTORY OF ESSENTIAL HYPERTENSION: Status: RESOLVED | Noted: 2017-08-31 | Resolved: 2017-09-01

## 2017-09-01 PROBLEM — Z87.891 FORMER SMOKER: Status: ACTIVE | Noted: 2017-08-31

## 2017-09-01 PROBLEM — Z86.39 HISTORY OF HYPOTHYROIDISM: Status: RESOLVED | Noted: 2017-09-01 | Resolved: 2017-09-01

## 2017-09-01 RX ORDER — RAMIPRIL 5 MG/1
5 CAPSULE ORAL
Refills: 0 | Status: ACTIVE | COMMUNITY

## 2017-09-01 RX ORDER — RIVAROXABAN 15 MG/1
15 TABLET, FILM COATED ORAL
Refills: 0 | Status: ACTIVE | COMMUNITY

## 2017-09-01 RX ORDER — BREXPIPRAZOLE 1 MG/1
1 TABLET ORAL
Refills: 0 | Status: ACTIVE | COMMUNITY

## 2017-09-01 RX ORDER — VENLAFAXINE HCL 50 MG
TABLET ORAL
Refills: 0 | Status: ACTIVE | COMMUNITY

## 2017-09-01 RX ORDER — LIOTHYRONINE SODIUM 50 UG/1
TABLET ORAL
Refills: 0 | Status: ACTIVE | COMMUNITY

## 2017-09-01 RX ORDER — HYDROCORTISONE 5 MG/1
5 TABLET ORAL
Refills: 0 | Status: ACTIVE | COMMUNITY

## 2017-09-01 RX ORDER — TAMSULOSIN HYDROCHLORIDE 0.4 MG/1
0.4 CAPSULE ORAL
Refills: 0 | Status: ACTIVE | COMMUNITY

## 2017-09-01 RX ORDER — LEVOTHYROXINE SODIUM 137 UG/1
TABLET ORAL
Refills: 0 | Status: ACTIVE | COMMUNITY

## 2017-09-06 PROBLEM — I77.79 DISSECTION OF MESENTERIC ARTERY: Status: ACTIVE | Noted: 2017-09-06

## 2017-09-06 PROBLEM — I72.3 ILIAC ARTERY ANEURYSM, RIGHT: Status: ACTIVE | Noted: 2017-09-06

## 2017-09-06 PROBLEM — I72.8 CELIAC ARTERY ANEURYSM: Status: ACTIVE | Noted: 2017-09-06

## 2020-10-02 PROBLEM — I77.71 CAROTID ARTERY DISSECTION: Status: ACTIVE | Noted: 2017-08-31

## 2024-04-17 NOTE — PATIENT PROFILE ADULT. - ALCOHOL USE HISTORY SINGLE SELECT
Spoke with patient. He did not have symptoms at time of sinus pause. States that he was at home playing video games that evening. Denies LH, dizziness, or syncope.   No recent issues that he reports, he continues on metoprolol   yes...